# Patient Record
Sex: FEMALE | Race: WHITE | NOT HISPANIC OR LATINO | Employment: FULL TIME | URBAN - METROPOLITAN AREA
[De-identification: names, ages, dates, MRNs, and addresses within clinical notes are randomized per-mention and may not be internally consistent; named-entity substitution may affect disease eponyms.]

---

## 2018-11-20 ENCOUNTER — OFFICE VISIT (OUTPATIENT)
Dept: FAMILY MEDICINE | Facility: CLINIC | Age: 43
End: 2018-11-20
Payer: COMMERCIAL

## 2018-11-20 ENCOUNTER — RADIANT APPOINTMENT (OUTPATIENT)
Dept: MAMMOGRAPHY | Facility: CLINIC | Age: 43
End: 2018-11-20
Payer: COMMERCIAL

## 2018-11-20 VITALS
BODY MASS INDEX: 22.66 KG/M2 | DIASTOLIC BLOOD PRESSURE: 64 MMHG | HEART RATE: 70 BPM | WEIGHT: 136 LBS | OXYGEN SATURATION: 99 % | TEMPERATURE: 98.1 F | SYSTOLIC BLOOD PRESSURE: 104 MMHG | HEIGHT: 65 IN

## 2018-11-20 DIAGNOSIS — N76.0 BV (BACTERIAL VAGINOSIS): ICD-10-CM

## 2018-11-20 DIAGNOSIS — Z30.011 INITIATION OF OCP (BCP): ICD-10-CM

## 2018-11-20 DIAGNOSIS — Z00.00 ANNUAL PHYSICAL EXAM: Primary | ICD-10-CM

## 2018-11-20 DIAGNOSIS — R53.82 CHRONIC FATIGUE: ICD-10-CM

## 2018-11-20 DIAGNOSIS — B96.89 BV (BACTERIAL VAGINOSIS): ICD-10-CM

## 2018-11-20 DIAGNOSIS — Z12.31 ENCOUNTER FOR SCREENING MAMMOGRAM FOR BREAST CANCER: ICD-10-CM

## 2018-11-20 DIAGNOSIS — N89.8 VAGINAL DISCHARGE: ICD-10-CM

## 2018-11-20 LAB
SPECIMEN SOURCE: ABNORMAL
WET PREP SPEC: ABNORMAL

## 2018-11-20 PROCEDURE — 87210 SMEAR WET MOUNT SALINE/INK: CPT | Performed by: FAMILY MEDICINE

## 2018-11-20 PROCEDURE — G0145 SCR C/V CYTO,THINLAYER,RESCR: HCPCS | Performed by: FAMILY MEDICINE

## 2018-11-20 PROCEDURE — 99213 OFFICE O/P EST LOW 20 MIN: CPT | Mod: 25 | Performed by: FAMILY MEDICINE

## 2018-11-20 PROCEDURE — 77067 SCR MAMMO BI INCL CAD: CPT | Mod: TC

## 2018-11-20 PROCEDURE — 99386 PREV VISIT NEW AGE 40-64: CPT | Performed by: FAMILY MEDICINE

## 2018-11-20 PROCEDURE — 87624 HPV HI-RISK TYP POOLED RSLT: CPT | Performed by: FAMILY MEDICINE

## 2018-11-20 RX ORDER — METRONIDAZOLE 500 MG/1
500 TABLET ORAL 2 TIMES DAILY
Qty: 14 TABLET | Refills: 0 | Status: SHIPPED | OUTPATIENT
Start: 2018-11-20 | End: 2018-11-27

## 2018-11-20 RX ORDER — DROSPIRENONE AND ETHINYL ESTRADIOL 0.03MG-3MG
1 KIT ORAL DAILY
Qty: 90 TABLET | Refills: 3 | Status: SHIPPED | OUTPATIENT
Start: 2018-11-20 | End: 2019-10-15

## 2018-11-20 NOTE — PROGRESS NOTES
SUBJECTIVE:   CC: Melissa Samuel is an 43 year old woman who presents for preventive health visit.     Healthy Habits:    Do you get at least three servings of calcium containing foods daily (dairy, green leafy vegetables, etc.)? yes    Amount of exercise or daily activities, outside of work: non    Problems taking medications regularly No    Medication side effects: Not now but was taking BC and was having extreme fatigue from it    Have you had an eye exam in the past two years? lasik    Do you see a dentist twice per year? yes    Do you have sleep apnea, excessive snoring or daytime drowsiness?no      Patient has been on Erlinda OCP in the past.  She stopped the medication as she thought it was causing fatigue.  Fatigue did improve after stopping the medication.  She was also using it not only for hirsutism and acne control but also for contraception.  She tells for the last 3 months since she stopped OCP she has not been sexually active. Periods are regular but it is causing more crampiness and more abdominal bloating and constipation.  She would like to be back on the medication.    Patient complains of a white discharge which is foul in order.    Today's PHQ-2 Score:   PHQ-2 (  [employer]) 2018   Q1: Little interest or pleasure in doing things 0   Q2: Feeling down, depressed or hopeless 0   PHQ-2 Score 0       Abuse: Current or Past(Physical, Sexual or Emotional)- No  Do you feel safe in your environment - Yes    Social History   Substance Use Topics     Smoking status: Never Smoker     Smokeless tobacco: Never Used     Alcohol use No     If you drink alcohol do you typically have >3 drinks per day or >7 drinks per week? No                     Reviewed orders with patient.  Reviewed health maintenance and updated orders accordingly - Yes  There is no problem list on file for this patient.    Past Surgical History:   Procedure Laterality Date      SECTION      2       Social History    Substance Use Topics     Smoking status: Never Smoker     Smokeless tobacco: Never Used     Alcohol use No     Family History   Problem Relation Age of Onset     Hypertension Mother      Hyperlipidemia Mother      Hypertension Father      Hyperlipidemia Father      Prostate Cancer Father          Current Outpatient Prescriptions   Medication Sig Dispense Refill     drospirenone-ethinyl estradiol (OCELLA) 3-0.03 MG per tablet Take 1 tablet by mouth daily 90 tablet 3     Fexofenadine HCl (ALLEGRA PO) Take 180 mg by mouth daily       metroNIDAZOLE (FLAGYL) 500 MG tablet Take 1 tablet (500 mg) by mouth 2 times daily for 7 days 14 tablet 0     Allergies   Allergen Reactions     Ciprofloxacin Hives       Patient under age 50, mutual decision reflected in health maintenance.      Pertinent mammograms are reviewed under the imaging tab.  History of abnormal Pap smear: NO - age 30- 65 PAP every 5 years recommended     Reviewed and updated as needed this visit by clinical staff  Tobacco  Allergies  Meds  Med Hx  Surg Hx  Fam Hx  Soc Hx        Reviewed and updated as needed this visit by Provider  Allergies  Meds  Med Hx  Fam Hx            ROS:  CONSTITUTIONAL: NEGATIVE for fever, chills, change in weight  INTEGUMENTARU/SKIN: NEGATIVE for worrisome rashes, moles or lesions  EYES: NEGATIVE for vision changes or irritation  ENT: NEGATIVE for ear, mouth and throat problems  RESP: NEGATIVE for significant cough or SOB  BREAST: NEGATIVE for masses, tenderness or discharge  CV: NEGATIVE for chest pain, palpitations or peripheral edema  GI: NEGATIVE for nausea, abdominal pain, heartburn, or change in bowel habits  : NEGATIVE for unusual urinary or vaginal symptoms. Periods are regular.  MUSCULOSKELETAL: NEGATIVE for significant arthralgias or myalgia  NEURO: NEGATIVE for weakness, dizziness or paresthesias  PSYCHIATRIC: NEGATIVE for changes in mood or affect    OBJECTIVE:   /64  Pulse 70  Temp 98.1  F (36.7  C)  "(Tympanic)  Ht 5' 5.5\" (1.664 m)  Wt 136 lb (61.7 kg)  LMP 11/10/2018  SpO2 99%  BMI 22.29 kg/m2  EXAM:  GENERAL: healthy, alert and no distress  EYES: Eyes grossly normal to inspection, PERRL and conjunctivae and sclerae normal  HENT: ear canals and TM's normal, nose and mouth without ulcers or lesions  NECK: no adenopathy, no asymmetry, masses, or scars and thyroid normal to palpation  RESP: lungs clear to auscultation - no rales, rhonchi or wheezes  BREAST: normal without masses, tenderness or nipple discharge and no palpable axillary masses or adenopathy  CV: regular rate and rhythm, normal S1 S2, no S3 or S4, no murmur, click or rub, no peripheral edema and peripheral pulses strong  ABDOMEN: soft, nontender, no hepatosplenomegaly, no masses and bowel sounds normal   (female): normal female external genitalia, normal urethral meatus, vaginal mucosa pink, moist, well rugated, and normal cervix/adnexa/uterus without masses.  White discharge noted  MS: no gross musculoskeletal defects noted, no edema  SKIN: no suspicious lesions or rashes  NEURO: Normal strength and tone, mentation intact and speech normal  PSYCH: mentation appears normal, affect normal/bright        ASSESSMENT/PLAN:   1. Annual physical exam  Screening fasting labs and Pap smear ordered.  - Lipid panel reflex to direct LDL Fasting; Future  - Comprehensive metabolic panel; Future  - HIV Antigen Antibody Combo; Future  - Pap imaged thin layer screen with HPV - recommended age 30 - 65 years (select HPV order below)  - HPV High Risk Types DNA Cervical    2. Initiation of OCP (BCP)  Starting the patient back on birth control pill.  - drospirenone-ethinyl estradiol (OCELLA) 3-0.03 MG per tablet; Take 1 tablet by mouth daily  Dispense: 90 tablet; Refill: 3    3. Chronic fatigue  Chronic fatigue could be due to other factors.  If after starting birth control pill, she notices fatigue starting up again we may need to consider other birth control " "options like Mirena.  This was discussed with the patient.  - CBC with platelets; Future  - TSH with free T4 reflex; Future  - Vitamin D Deficiency; Future  - Ferritin; Future  - Iron and iron binding capacity; Future    4. Encounter for screening mammogram for breast cancer    - *MA Screening Digital Bilateral; Future    5. Vaginal discharge    - Wet prep- BV noted    6. BV (bacterial vaginosis)  Started patient on metronidazole.  - metroNIDAZOLE (FLAGYL) 500 MG tablet; Take 1 tablet (500 mg) by mouth 2 times daily for 7 days  Dispense: 14 tablet; Refill: 0    COUNSELING:   Reviewed preventive health counseling, as reflected in patient instructions       Regular exercise       Healthy diet/nutrition    BP Readings from Last 1 Encounters:   11/20/18 104/64     Estimated body mass index is 22.29 kg/(m^2) as calculated from the following:    Height as of this encounter: 5' 5.5\" (1.664 m).    Weight as of this encounter: 136 lb (61.7 kg).           reports that she has never smoked. She has never used smokeless tobacco.      Counseling Resources:  ATP IV Guidelines  Pooled Cohorts Equation Calculator  Breast Cancer Risk Calculator  FRAX Risk Assessment  ICSI Preventive Guidelines  Dietary Guidelines for Americans, 2010  [employer]'s MyPlate  ASA Prophylaxis  Lung CA Screening    Martínez Mitchell MD  [facility] [place]  "

## 2018-11-20 NOTE — MR AVS SNAPSHOT
After Visit Summary   11/20/2018    Melissa Samuel    MRN: [id number]           Patient Information     Date Of Birth          1975        Visit Information        Provider Department      11/20/2018 4:00 PM Martínez Mitchell MD [facility] [place]        Today's Diagnoses     Annual physical exam    -  1    Initiation of OCP (BCP)        Chronic fatigue        Encounter for screening mammogram for breast cancer        Vaginal discharge          Care Instructions      Preventive Health Recommendations  Female Ages 40 to 49    Yearly exam:     See your health care provider every year in order to  1. Review health changes.   2. Discuss preventive care.    3. Review your medicines if your doctor prescribed any.      Get a Pap test every three years (unless you have an abnormal result and your provider advises testing more often).      If you get Pap tests with HPV test, you only need to test every 5 years, unless you have an abnormal result. You do not need a Pap test if your uterus was removed (hysterectomy) and you have not had cancer.      You should be tested each year for STDs (sexually transmitted diseases), if you're at risk.     Ask your doctor if you should have a mammogram.      Have a colonoscopy (test for colon cancer) if someone in your family has had colon cancer or polyps before age 50.       Have a cholesterol test every 5 years.       Have a diabetes test (fasting glucose) after age 45. If you are at risk for diabetes, you should have this test every 3 years.    Shots: Get a flu shot each year. Get a tetanus shot every 10 years.     Nutrition:     Eat at least 5 servings of fruits and vegetables each day.    Eat whole-grain bread, whole-wheat pasta and brown rice instead of white grains and rice.    Get adequate Calcium and Vitamin D.      Lifestyle    Exercise at least 150 minutes a week (an average of 30 minutes a day, 5 days a week). This will help you control your weight  and prevent disease.    Limit alcohol to one drink per day.    No smoking.     Wear sunscreen to prevent skin cancer.    See your dentist every six months for an exam and cleaning.          Follow-ups after your visit        Follow-up notes from your care team     Return in about 1 day (around 11/21/2018) for Fasting labs only visit.      Your next 10 appointments already scheduled     Nov 26, 2018 11:30 AM CST   LAB with [facility]   [facility]    [address] [place] [address] [id number] [phone]           OUTSIDE LABS: Please include name of facility and Physician that is requesting outside labs be drawn.  Please indicate if labs are fasting or non-fasting on appt notes.  Be as specific as you can on which labs are being drawn.              Future tests that were ordered for you today     Open Future Orders        Priority Expected Expires Ordered    *MA Screening Digital Bilateral Routine  11/20/2019 11/20/2018    Lipid panel reflex to direct LDL Fasting Routine  3/20/2019 11/20/2018    Comprehensive metabolic panel Routine  3/20/2019 11/20/2018    CBC with platelets Routine  3/20/2019 11/20/2018    TSH with free T4 reflex Routine  3/20/2019 11/20/2018    Vitamin D Deficiency Routine  3/20/2019 11/20/2018    Ferritin Routine  3/20/2019 11/20/2018    Iron and iron binding capacity Routine  3/20/2019 11/20/2018    HIV Antigen Antibody Combo Routine  11/20/2019 11/20/2018            Who to contact     If you have questions or need follow up information about today's clinic visit or your schedule please contact [facility] directly at [phone].  Normal or non-critical lab and imaging results will be communicated to you by MyChart, letter or phone within 4 business days after the clinic has received the results. If you do not hear from us within 7 days, please contact the clinic through MyChart or phone. If you have a critical or  "abnormal lab result, we will notify you by phone as soon as possible.  Submit refill requests through My Team Zone or call your pharmacy and they will forward the refill request to us. Please allow 3 business days for your refill to be completed.          Additional Information About Your Visit        [employer] Information     My Team Zone lets you send messages to your doctor, view your test results, renew your prescriptions, schedule appointments and more. To sign up, go to [email or web address] . Click on \"Log in\" on the left side of the screen, which will take you to the Welcome page. Then click on \"Sign up Now\" on the right side of the page.     You will be asked to enter the access code listed below, as well as some personal information. Please follow the directions to create your username and password.     Your access code is: [email or web address]  Expires: 2019  4:44 PM     Your access code will  in 90 days. If you need help or a new code, please call your [place] clinic or [phone].        Care EveryWhere ID     This is your Care EveryWhere ID. This could be used by other organizations to access your [place] medical records  [id number]        Your Vitals Were     Pulse Temperature Height Last Period Pulse Oximetry BMI (Body Mass Index)    70 98.1  F (36.7  C) (Tympanic) 5' 5.5\" (1.664 m) 11/10/2018 99% 22.29 kg/m2       Blood Pressure from Last 3 Encounters:   18 104/64    Weight from Last 3 Encounters:   18 136 lb (61.7 kg)              We Performed the Following     Wet prep          Today's Medication Changes          These changes are accurate as of 18  4:44 PM.  If you have any questions, ask your nurse or doctor.               Start taking these medicines.        Dose/Directions    drospirenone-ethinyl estradiol 3-0.03 MG per tablet   Commonly known as:  OCELLA   Used for:  Initiation of OCP (BCP)   Started by:  Martínez Mitchell MD        Dose:  1 tablet   Take 1 tablet by mouth daily "   Quantity:  90 tablet   Refills:  3            Where to get your medicines      These medications were sent to [id number] [address] [place] [address] AT [facility] [address]  [address]  [address] [address], [facility] [address]     Phone:  [phone]     drospirenone-ethinyl estradiol 3-0.03 MG per tablet                Primary Care Provider Office Phone # Fax #    Martínez Mitchell [phone]       [address]  [place] [address]        Equal Access to Services     [facility] : Hadii aad ku hadasho Soomaali, waaxda luqadaha, qaybta kaalmada adeegyada, javan brown . So [facility] [phone].    ATENCIÓN: Si habla español, tiene a harrell disposición servicios gratuitos de asistencia lingüística. [facility] [phone].    We comply with applicable federal civil rights laws and [place] laws. We do not discriminate on the basis of race, color, national origin, age, disability, sex, sexual orientation, or gender identity.            Thank you!     Thank you for choosing [facility] [place]  for your care. Our goal is always to provide you with excellent care. Hearing back from our patients is one way we can continue to improve our services. Please take a few minutes to complete the written survey that you may receive in the mail after your visit with us. Thank you!             Your Updated Medication List - Protect others around you: Learn how to safely use, store and throw away your medicines at www.disposemymeds.org.          This list is accurate as of 11/20/18  4:44 PM.  Always use your most recent med list.                   Brand Name Dispense Instructions for use Diagnosis    ALLEGRA PO      Take 180 mg by mouth daily        drospirenone-ethinyl estradiol 3-0.03 MG per tablet    OCELLA    90 tablet    Take 1 tablet by mouth daily    Initiation of OCP (BCP)

## 2018-11-20 NOTE — PROGRESS NOTES
Please call the patient to notify patient that the wet prep shows bacterial vaginosis.  Metronidazole ordered for 7 days    Martínez Mitchell MD

## 2018-11-26 DIAGNOSIS — Z00.00 ANNUAL PHYSICAL EXAM: ICD-10-CM

## 2018-11-26 DIAGNOSIS — R79.89 ABNORMAL LFTS: Primary | ICD-10-CM

## 2018-11-26 DIAGNOSIS — R53.82 CHRONIC FATIGUE: ICD-10-CM

## 2018-11-26 LAB
COPATH REPORT: NORMAL
ERYTHROCYTE [DISTWIDTH] IN BLOOD BY AUTOMATED COUNT: 12.9 % (ref 10–15)
HCT VFR BLD AUTO: 39.9 % (ref 35–47)
HGB BLD-MCNC: 12.9 G/DL (ref 11.7–15.7)
MCH RBC QN AUTO: 29.7 PG (ref 26.5–33)
MCHC RBC AUTO-ENTMCNC: 32.3 G/DL (ref 31.5–36.5)
MCV RBC AUTO: 92 FL (ref 78–100)
PAP: NORMAL
PLATELET # BLD AUTO: 184 10E9/L (ref 150–450)
RBC # BLD AUTO: 4.34 10E12/L (ref 3.8–5.2)
WBC # BLD AUTO: 6.7 10E9/L (ref 4–11)

## 2018-11-26 PROCEDURE — 82306 VITAMIN D 25 HYDROXY: CPT | Performed by: FAMILY MEDICINE

## 2018-11-26 PROCEDURE — 83540 ASSAY OF IRON: CPT | Performed by: FAMILY MEDICINE

## 2018-11-26 PROCEDURE — 80061 LIPID PANEL: CPT | Performed by: FAMILY MEDICINE

## 2018-11-26 PROCEDURE — 36415 COLL VENOUS BLD VENIPUNCTURE: CPT | Performed by: FAMILY MEDICINE

## 2018-11-26 PROCEDURE — 83550 IRON BINDING TEST: CPT | Performed by: FAMILY MEDICINE

## 2018-11-26 PROCEDURE — 85027 COMPLETE CBC AUTOMATED: CPT | Performed by: FAMILY MEDICINE

## 2018-11-26 PROCEDURE — 84443 ASSAY THYROID STIM HORMONE: CPT | Performed by: FAMILY MEDICINE

## 2018-11-26 PROCEDURE — 82728 ASSAY OF FERRITIN: CPT | Performed by: FAMILY MEDICINE

## 2018-11-26 PROCEDURE — 87389 HIV-1 AG W/HIV-1&-2 AB AG IA: CPT | Performed by: FAMILY MEDICINE

## 2018-11-26 PROCEDURE — 80053 COMPREHEN METABOLIC PANEL: CPT | Performed by: FAMILY MEDICINE

## 2018-11-27 LAB
ALBUMIN SERPL-MCNC: 4.4 G/DL (ref 3.4–5)
ALP SERPL-CCNC: 41 U/L (ref 40–150)
ALT SERPL W P-5'-P-CCNC: 63 U/L (ref 0–50)
ANION GAP SERPL CALCULATED.3IONS-SCNC: 9 MMOL/L (ref 3–14)
AST SERPL W P-5'-P-CCNC: 55 U/L (ref 0–45)
BILIRUB SERPL-MCNC: 0.7 MG/DL (ref 0.2–1.3)
BUN SERPL-MCNC: 13 MG/DL (ref 7–30)
CALCIUM SERPL-MCNC: 9.6 MG/DL (ref 8.5–10.1)
CHLORIDE SERPL-SCNC: 104 MMOL/L (ref 94–109)
CHOLEST SERPL-MCNC: 152 MG/DL
CO2 SERPL-SCNC: 24 MMOL/L (ref 20–32)
CREAT SERPL-MCNC: 0.59 MG/DL (ref 0.52–1.04)
DEPRECATED CALCIDIOL+CALCIFEROL SERPL-MC: 30 UG/L (ref 20–75)
FERRITIN SERPL-MCNC: 60 NG/ML (ref 12–150)
FINAL DIAGNOSIS: NORMAL
GFR SERPL CREATININE-BSD FRML MDRD: >90 ML/MIN/1.7M2
GLUCOSE SERPL-MCNC: 86 MG/DL (ref 70–99)
HDLC SERPL-MCNC: 66 MG/DL
HIV 1+2 AB+HIV1 P24 AG SERPL QL IA: NONREACTIVE
HPV HR 12 DNA CVX QL NAA+PROBE: NEGATIVE
HPV16 DNA SPEC QL NAA+PROBE: NEGATIVE
HPV18 DNA SPEC QL NAA+PROBE: NEGATIVE
IRON SATN MFR SERPL: 34 % (ref 15–46)
IRON SERPL-MCNC: 121 UG/DL (ref 35–180)
LDLC SERPL CALC-MCNC: 77 MG/DL
NONHDLC SERPL-MCNC: 86 MG/DL
POTASSIUM SERPL-SCNC: 3.8 MMOL/L (ref 3.4–5.3)
PROT SERPL-MCNC: 7.9 G/DL (ref 6.8–8.8)
SODIUM SERPL-SCNC: 137 MMOL/L (ref 133–144)
SPECIMEN DESCRIPTION: NORMAL
SPECIMEN SOURCE CVX/VAG CYTO: NORMAL
TIBC SERPL-MCNC: 361 UG/DL (ref 240–430)
TRIGL SERPL-MCNC: 46 MG/DL
TSH SERPL DL<=0.005 MIU/L-ACNC: 1.9 MU/L (ref 0.4–4)

## 2019-10-11 ENCOUNTER — NURSE TRIAGE (OUTPATIENT)
Dept: FAMILY MEDICINE | Facility: CLINIC | Age: 44
End: 2019-10-11

## 2019-10-11 NOTE — TELEPHONE ENCOUNTER
Reason for call:  Patient reporting a symptom    Symptom or request: vaginal odor and wetness - has had bacterial vaginosis in the past and states it is the same symptoms as before. Patient would like to know if she should come in or if she can get a prescription sent to [facility] [address] in [place] instead.     Duration (how long have symptoms been present): 7 weeks    Have you been treated for this before? Yes    Additional comments: none    Phone Number patient can be reached at:  Cell number on file:    Telephone Information:   Mobile [phone]       Best Time:  anytime    Can we leave a detailed message on this number:  YES    Call taken on 10/11/2019 at 2:47 PM by Rajiv MUNOZ

## 2019-10-11 NOTE — TELEPHONE ENCOUNTER
"Pt thinks she has bacterial vaginosis again and wondering if needs to be seen or if can be treated with medication again?    Advised needs to be seen and is scheduled with Dr. Mitchell on Tuesday at 4:00 pm.  Pt agrees with amirahtNing MOORE RN  EP Triage    Reason for Disposition    All other vaginal symptoms  (Exception: feels like prior yeast infection, minor abrasion, mild rash < 24 hour duration, mild itching)    Additional Information    Negative: Followed a genital area injury    Negative: Foreign body in vagina (e.g., tampon)    Negative: Vaginal bleeding is main symptom    Negative: Vaginal discharge is main symptom    Negative: Pain or burning with urination is main symptom    Negative: Menstrual cramps is main symptom    Negative: Abdomen pain is main symptom    Negative: Pubic lice suspected    Negative: Itching or rash of external female genital area (vulva)    Negative: Patient sounds very sick or weak to the triager    Negative: [1] SEVERE pain AND [2] not improved 2 hours after pain medicine    Negative: [1] Genital area looks infected (e.g., draining sore, spreading redness) AND [2] fever    Negative: [1] Something is hanging out of the vagina AND [2] can't easily be pushed back inside    Negative: MODERATE-SEVERE itching (i.e., interferes with school, work, or sleep)    Negative: Genital area looks infected (e.g., draining sore, spreading redness)    Negative: Rash with painful tiny water blisters    Negative: [1] Rash (e.g., redness, tiny bumps, sore) of genital area AND [2] present > 24 hours    Negative: Tender lump (swelling or \"ball\") at vaginal opening    Negative: [1] Symptoms of a yeast infection (i.e., itchy, white discharge, not bad smelling) AND    [2] not improved > 3 days following CARE ADVICE    Negative: [1] Vaginal itching AND [2] not improved > 3 days following CARE ADVICE    Negative: Patient is worried about sexually transmitted disease (STD)    Answer Assessment - Initial " "Assessment Questions  1. SYMPTOM: \"What's the main symptom you're concerned about?\" (e.g., pain, itching, dryness)      Odor, wetness, and discharge  2. LOCATION: \"Where is the   located?\" (e.g., inside/outside, left/right)      Outside the vagina  3. ONSET: \"When did the    start?\"      7 weeks ago  4. PAIN: \"Is there any pain?\" If so, ask: \"How bad is it?\" (Scale: 1-10; mild, moderate, severe)      no  5. ITCHING: \"Is there any itching?\" If so, ask: \"How bad is it?\" (Scale: 1-10; mild, moderate, severe)      no  6. CAUSE: \"What do you think is causing the discharge?\" \"Have you had the same problem before? What happened then?\"      Bacterial vaginosis.  Yes had the problem before in Nov 2018 and the same sxs of odor, wetness, and discharge  7. OTHER SYMPTOMS: \"Do you have any other symptoms?\" (e.g., fever, itching, vaginal bleeding, pain with urination, injury to genital area, vaginal foreign body)      No other sxs  8. PREGNANCY: \"Is there any chance you are pregnant?\" \"When was your last menstrual period?\"      no    Protocols used: VAGINAL SYMPTOMS-A-AH    "

## 2019-10-15 ENCOUNTER — OFFICE VISIT (OUTPATIENT)
Dept: FAMILY MEDICINE | Facility: CLINIC | Age: 44
End: 2019-10-15
Payer: COMMERCIAL

## 2019-10-15 VITALS
OXYGEN SATURATION: 99 % | SYSTOLIC BLOOD PRESSURE: 104 MMHG | HEIGHT: 65 IN | HEART RATE: 77 BPM | WEIGHT: 140 LBS | BODY MASS INDEX: 23.32 KG/M2 | DIASTOLIC BLOOD PRESSURE: 64 MMHG | TEMPERATURE: 97.3 F

## 2019-10-15 DIAGNOSIS — Z30.8 ENCOUNTER FOR OTHER CONTRACEPTIVE MANAGEMENT: ICD-10-CM

## 2019-10-15 DIAGNOSIS — N89.8 VAGINAL ODOR: Primary | ICD-10-CM

## 2019-10-15 LAB
SPECIMEN SOURCE: NORMAL
WET PREP SPEC: NORMAL

## 2019-10-15 PROCEDURE — 87210 SMEAR WET MOUNT SALINE/INK: CPT | Performed by: FAMILY MEDICINE

## 2019-10-15 PROCEDURE — 99213 OFFICE O/P EST LOW 20 MIN: CPT | Performed by: FAMILY MEDICINE

## 2019-10-15 RX ORDER — DROSPIRENONE AND ETHINYL ESTRADIOL 0.03MG-3MG
1 KIT ORAL DAILY
Qty: 90 TABLET | Refills: 3 | Status: SHIPPED | OUTPATIENT
Start: 2019-10-15 | End: 2019-12-12

## 2019-10-15 ASSESSMENT — MIFFLIN-ST. JEOR: SCORE: 1303.79

## 2019-10-15 NOTE — PROGRESS NOTES
Subjective     Melissa Samuel is a 44 year old female who presents to clinic today for the following health issues:    HPI   Vaginal Symptoms  Onset: x 6 weeks    Description:  Vaginal Discharge: water like    Itching (Pruritis): no   Burning sensation:  YES  Odor: YES    Accompanying Signs & Symptoms:  Pain with Urination: no   Abdominal Pain: no   Fever: no     History:   Sexually active: YES  New Partner: no   Possibility of Pregnancy:  No    Precipitating factors:   Recent Antibiotic Use: no     Alleviating factors:      Patient would also like to start OCPs back again.  She tells that she was on brand-name Mahendra for 10 years without any problems.  She was switched to generic of that and within 3 months she started noticing excessive bloating.  Would like to get brand-name Mahendra ordered again for her.  She wonders if it is due to hormone imbalance that she is getting excessive discharge.      There is no problem list on file for this patient.    Past Surgical History:   Procedure Laterality Date      SECTION      2       Social History     Tobacco Use     Smoking status: Never Smoker     Smokeless tobacco: Never Used   Substance Use Topics     Alcohol use: No     Family History   Problem Relation Age of Onset     Hypertension Mother      Hyperlipidemia Mother      Hypertension Father      Hyperlipidemia Father      Prostate Cancer Father          Current Outpatient Medications   Medication Sig Dispense Refill     Fexofenadine HCl (ALLEGRA PO) Take 180 mg by mouth daily       MAHENDRA 28 3-0.03 MG tablet Take 1 tablet by mouth daily 90 tablet 3     Allergies   Allergen Reactions     Ciprofloxacin Hives         Reviewed and updated as needed this visit by Provider         Review of Systems   ROS COMP: INTEGUMENTARY/SKIN: NEGATIVE for worrisome rashes, moles or lesions  GI: NEGATIVE for nausea, abdominal pain, heartburn, or change in bowel habits      Objective    /64   Pulse 77   Temp 97.3  F  "(36.3  C) (Tympanic)   Ht 1.664 m (5' 5.5\")   Wt 63.5 kg (140 lb)   LMP 10/01/2019   SpO2 99%   BMI 22.95 kg/m    Body mass index is 22.95 kg/m .  Physical Exam   GENERAL: healthy, alert and no distress  RESP: lungs clear to auscultation - no rales, rhonchi or wheezes  CV: regular rate and rhythm, normal S1 S2, no S3 or S4, no murmur, click or rub, no peripheral edema and peripheral pulses strong  ABDOMEN: soft, nontender, no hepatosplenomegaly, no masses and bowel sounds normal    Results for orders placed or performed in visit on 10/15/19   Wet prep   Result Value Ref Range    Specimen Description Vagina     Wet Prep No clue cells seen     Wet Prep No Trichomonas seen     Wet Prep No yeast seen              Assessment & Plan     1. Vaginal odor  No signs of vaginitis noted.  Patient reassured.  Excessive vaginal discharge is likely physiological.  - Wet prep    2. Encounter for other contraceptive management  Brand-name he has been ordered per patient's request.  - MAHENDRA 28 3-0.03 MG tablet; Take 1 tablet by mouth daily  Dispense: 90 tablet; Refill: 3         Martínez Mitchell MD  [facility] [place]      "

## 2019-11-22 ENCOUNTER — ANCILLARY PROCEDURE (OUTPATIENT)
Dept: MAMMOGRAPHY | Facility: CLINIC | Age: 44
End: 2019-11-22
Payer: COMMERCIAL

## 2019-11-22 DIAGNOSIS — Z12.31 VISIT FOR SCREENING MAMMOGRAM: ICD-10-CM

## 2019-11-22 PROCEDURE — 77067 SCR MAMMO BI INCL CAD: CPT | Mod: TC

## 2019-12-12 ENCOUNTER — OFFICE VISIT (OUTPATIENT)
Dept: OBGYN | Facility: CLINIC | Age: 44
End: 2019-12-12
Payer: COMMERCIAL

## 2019-12-12 VITALS — HEIGHT: 66 IN | WEIGHT: 139 LBS | BODY MASS INDEX: 22.34 KG/M2

## 2019-12-12 DIAGNOSIS — R79.89 ELEVATED LFTS: ICD-10-CM

## 2019-12-12 DIAGNOSIS — N94.6 DYSMENORRHEA: ICD-10-CM

## 2019-12-12 DIAGNOSIS — Z01.411 ENCOUNTER FOR GYNECOLOGICAL EXAMINATION WITH ABNORMAL FINDING: Primary | ICD-10-CM

## 2019-12-12 DIAGNOSIS — Z86.2 HISTORY OF ANEMIA: ICD-10-CM

## 2019-12-12 DIAGNOSIS — N93.9 ABNORMAL UTERINE BLEEDING (AUB): ICD-10-CM

## 2019-12-12 DIAGNOSIS — M25.559 PAIN IN JOINT INVOLVING PELVIC REGION AND THIGH, UNSPECIFIED LATERALITY: ICD-10-CM

## 2019-12-12 PROCEDURE — 80053 COMPREHEN METABOLIC PANEL: CPT | Performed by: OBSTETRICS & GYNECOLOGY

## 2019-12-12 PROCEDURE — 36415 COLL VENOUS BLD VENIPUNCTURE: CPT | Performed by: OBSTETRICS & GYNECOLOGY

## 2019-12-12 PROCEDURE — 82728 ASSAY OF FERRITIN: CPT | Performed by: OBSTETRICS & GYNECOLOGY

## 2019-12-12 PROCEDURE — 99213 OFFICE O/P EST LOW 20 MIN: CPT | Mod: 25 | Performed by: OBSTETRICS & GYNECOLOGY

## 2019-12-12 PROCEDURE — 99386 PREV VISIT NEW AGE 40-64: CPT | Performed by: OBSTETRICS & GYNECOLOGY

## 2019-12-12 RX ORDER — MULTIVIT-MIN/IRON/FOLIC ACID/K 18-600-40
1 CAPSULE ORAL DAILY
COMMUNITY
End: 2023-01-30

## 2019-12-12 RX ORDER — MULTIVIT-MIN/IRON/FOLIC ACID/K 18-600-40
2 CAPSULE ORAL DAILY
COMMUNITY
End: 2023-01-31

## 2019-12-12 ASSESSMENT — ANXIETY QUESTIONNAIRES
5. BEING SO RESTLESS THAT IT IS HARD TO SIT STILL: SEVERAL DAYS
IF YOU CHECKED OFF ANY PROBLEMS ON THIS QUESTIONNAIRE, HOW DIFFICULT HAVE THESE PROBLEMS MADE IT FOR YOU TO DO YOUR WORK, TAKE CARE OF THINGS AT HOME, OR GET ALONG WITH OTHER PEOPLE: SOMEWHAT DIFFICULT
2. NOT BEING ABLE TO STOP OR CONTROL WORRYING: SEVERAL DAYS
3. WORRYING TOO MUCH ABOUT DIFFERENT THINGS: SEVERAL DAYS
7. FEELING AFRAID AS IF SOMETHING AWFUL MIGHT HAPPEN: NOT AT ALL
GAD7 TOTAL SCORE: 6
6. BECOMING EASILY ANNOYED OR IRRITABLE: SEVERAL DAYS
1. FEELING NERVOUS, ANXIOUS, OR ON EDGE: SEVERAL DAYS

## 2019-12-12 ASSESSMENT — MIFFLIN-ST. JEOR: SCORE: 1307.25

## 2019-12-12 ASSESSMENT — PATIENT HEALTH QUESTIONNAIRE - PHQ9
5. POOR APPETITE OR OVEREATING: SEVERAL DAYS
SUM OF ALL RESPONSES TO PHQ QUESTIONS 1-9: 7

## 2019-12-12 NOTE — PATIENT INSTRUCTIONS
-Daily total calcium intake (between food/supplements) should be 1000mg which equates to 3-4 servings calcium containing food per day; VItamin D 1000IU.   Foods rich in calcium are: milk, cheese, yogurt, seafood, sardines and canned salmon, leafy green vegetables such as jonathon greens, spinach and kale, beans and lentils, almonds, seeds (poppy, sesame, celery, flavio), rhubarb, dried fruit such as figs, whey protein, tofu and edamame, amaranth, other foods with added calcium such as orange juice and some cereals.   If adequate amount not taken in diet, then a supplement may be needed.     -I also recommend increasing your dietary fiber by starting Metamucil (powder mixed in glass of water) twice daily

## 2019-12-12 NOTE — PROGRESS NOTES
Melissa is a 44 year old No obstetric history on file. female who presents for annual exam.     Besides routine health maintenance,  she would like to discuss pelvic pain all month long since June this time.    HPI:  The patient's PCP is Martínez Mitchell MD.    NP  Had been seeing FP and wanted to see GYn for some of her issues.     Mammo in Nov.  Pap 11/18 with STD testing.  Colonosc 2009.     Hx of  Heavy periods, pelvic pain, hair symptoms, acne. Thought it wasn't making her feel very well couple years ago. Stopped BC summer 2018 and had resumption of pelvic pain. Restarted in Nove 2018 and pain helped some, but felt poorly so stopped in June. Periods painful, will have pain in rectal area, midline, out to sides in ovaries. Thinks she has about 5 good days per month. Pain will feel raw, pulling. Rest of the month will be cramping. Alternates. Sitting/walking will flare it. Pushing on her abd from outside makes it worse. Takes IBU which takes edge off, magnesium oxide maybe helps some.   No pain with intercourse.   Hx c/s x 2.   Periods are getting closer together, will spt for 2-4d prior to bleeding starting, flow lasts 4-7days.   Has not had any workup for these issues.     Hx IV iron transfusions 1-2x/yr. Since daughter born no issues with this. Likes to get it checked.    No hx abnormal paps.     Hx elevated LFTs. No regular alcohol consumption.     +constipation. Uses magnesium citrate as needed.         Review of PMH, SocHx, SurHx, FHx, medications completed. Epic updated.        GYNECOLOGIC HISTORY:    Patient's last menstrual period was 11/22/2019.    Regular menses? yes  Menses every 25 days.  Length of menses: 4-7 days    Her current contraception method is: condoms.  She  reports that she has never smoked. She has never used smokeless tobacco.    Patient is sexually active.  STD testing offered?  Declined  Last PHQ-9 score on record =   PHQ-9 SCORE 12/12/2019   PHQ-9 Total Score 7     Last GAD7 score on  "record =   GABY-7 SCORE 2019   Total Score 6     Alcohol Score = 1    HEALTH MAINTENANCE:  Cholesterol:   Cholesterol   Date Value Ref Range Status   2018 152 <200 mg/dL Final      Recent Labs   Lab Test 18  1127   CHOL 152   HDL 66   LDL 77   TRIG 46     Last Mammo: 19, Result: Normal, Next Mammo: next year   Pap:   Lab Results   Component Value Date    PAP NIL 2018 WNL HPV (-)neg  Colonoscopy:  , Result: Normal, Next Colonoscopy: NA years.  Dexa:  NA    Health maintenance updated:  yes    HISTORY:  OB History    Para Term  AB Living   3 2 2 0 1 2   SAB TAB Ectopic Multiple Live Births   1 0 0 0 2      # Outcome Date GA Lbr Neo/2nd Weight Sex Delivery Anes PTL Lv   3 Term 14 39w1d  3.615 kg (7 lb 15.5 oz) F CS-Unspec Spinal N ARAM      Name: STEWART,BABY GIRL      Apgar1: 9  Apgar5: 9   2 Term 12 38w6d  3.065 kg (6 lb 12.1 oz) M CS-Unspec  N ARAM      Birth Comments: C/S for hx of fistulas      Name: Yves Simmons\"\"\"\"      Apgar1: 8  Apgar5: 9   1 SAB 2010               There is no problem list on file for this patient.    Past Surgical History:   Procedure Laterality Date     APPENDECTOMY        SECTION      x2     COLONOSCOPY      Hx IBS type symptoms, workup for this     FISTULECTOMY RECTUM        Social History     Tobacco Use     Smoking status: Never Smoker     Smokeless tobacco: Never Used   Substance Use Topics     Alcohol use: No      Problem (# of Occurrences) Relation (Name,Age of Onset)    Hyperlipidemia (2) Mother, Father    Hypertension (2) Mother, Father    No Known Problems (6) Sister, Brother, Maternal Grandmother, Maternal Grandfather, Paternal Grandmother, Other    Prostate Cancer (1) Father            Current Outpatient Medications   Medication Sig     Ascorbic Acid (VITAMIN C) 500 MG CAPS      Docosahexaenoic Acid (DHA) 200 MG capsule Take 200 mg by mouth daily     Fexofenadine HCl (ALLEGRA PO) Take 180 mg by " "mouth daily     Magnesium Oxide 140 MG CAPS      Vitamin D, Cholecalciferol, 25 MCG (1000 UT) TABS      No current facility-administered medications for this visit.      Allergies   Allergen Reactions     Ciprofloxacin Hives       Past medical, surgical, social and family histories were reviewed and updated in EPIC.    ROS:   12 point review of systems negative other than symptoms noted below or in the HPI.  Genitourinary: Cramps, Irregular Menses and Pelvic Pain  No urinary frequency or dysuria, bladder or kidney problems. Positive for constipation.     EXAM:  Ht 1.676 m (5' 6\")   Wt 63 kg (139 lb)   LMP 11/22/2019   Breastfeeding No   BMI 22.44 kg/m     BMI: Body mass index is 22.44 kg/m .    PHYSICAL EXAM:  Constitutional:   Appearance: Well nourished, well developed, alert, in no acute distress  Neck:  Lymph Nodes:  No lymphadenopathy present    Thyroid:  Gland size normal, nontender, no nodules or masses present  on palpation  Chest:  Respiratory Effort:  Breathing unlabored  Cardiovascular:    Heart: Auscultation:  Regular rate, normal rhythm, no murmurs present  Breasts: Inspection of Breasts:  No lymphadenopathy present., Palpation of Breasts and Axillae:  No masses present on palpation, no breast tenderness., Axillary Lymph Nodes:  No lymphadenopathy present. and No nodularity, asymmetry or nipple discharge bilaterally.  Gastrointestinal:   Abdominal Examination:  Abdomen with diffuse low quadrant tenderness to palpation, tone normal without rigidity or guarding, no masses present, umbilicus without lesions   Liver and Spleen:  No hepatomegaly present, liver nontender to palpation    Hernias:  No hernias present  Lymphatic: Lymph Nodes:  No other lymphadenopathy present  Skin:  General Inspection:  No rashes present, no lesions present, no areas of  discoloration  Neurologic:    Mental Status:  Oriented X3.  Normal strength and tone, sensory exam                grossly normal, mentation intact and " speech normal.    Psychiatric:   Mentation appears normal and affect normal/bright.         Pelvic Exam:  External Genitalia:     Normal appearance for age, no discharge present, no tenderness present, no inflammatory lesions present, color normal; NEG Q TIP TESTING  Vagina:     Normal vaginal vault without central or paravaginal defects, no discharge present, no inflammatory lesions present, no masses present: NEG LA, OI ttp  Bladder:     Nontender to palpation  Urethra:   Urethral Body:  Urethra palpation normal, urethra structural support normal   Urethral Meatus:  No erythema or lesions present  Cervix:     Appearance healthy, no lesions present, nontender to palpation, no bleeding present  Uterus:     Uterus: firm, normal sized and TTP, anteverted in position.   Adnexa:     Bilateral adnexal tenderness present, no adnexal masses present  Perineum:     Perineum within normal limits, no evidence of trauma, no rashes or skin lesions present  Anus:     Anus within normal limits, no hemorrhoids present  Inguinal Lymph Nodes:     No lymphadenopathy present  Pubic Hair:     Normal pubic hair distribution for age  Genitalia and Groin:     No rashes present, no lesions present, no areas of discoloration, no masses present      COUNSELING:   Reviewed preventive health counseling, as reflected in patient instructions       Osteoporosis Prevention/Bone Health    BMI: Body mass index is 22.44 kg/m .      ASSESSMENT:  44 year old female with satisfactory annual exam.    ICD-10-CM    1. Encounter for gynecological examination with abnormal finding Z01.411 Comprehensive metabolic panel   2. Abnormal uterine bleeding (AUB) N93.9 Ferritin     US Transvaginal Non OB   3. Pain in joint involving pelvic region and thigh, unspecified laterality M25.559 US Transvaginal Non OB   4. Elevated LFTs R94.5 Comprehensive metabolic panel   5. History of anemia Z86.2 Ferritin   6. Dysmenorrhea N94.6        PLAN:  -UTD for cervical cancer  screening. Reviewed guidelines-pap q 3yrs until age 30 when co-testing q 5 years.  -Breast self awareness discussed. UTD for mammogram.  -Discussed exercise-making plan, strength training. Nutrition encouraged.  -Osteoporosis prevention discussed.  -Desires CMP, Ferritin labs.   -Pelvic pain.  Discussed etiologies of pelvic pain and dysmenorrhea.  Patient is essentially had this issue for majority of her life.  Previously had been controlled with birth control pills, then when she came off and attempted resume has been less successful treating her pain.  On exam is diffusely tender, no overt masses noted.  Has no positive Q-tip testing, no tenderness of pelvic floor.  Will check pelvic ultrasound.  Discussed medication management versus surgical evaluation.  Will discuss next step after her ultrasound.  Questions answered.  -Return one year for next annual exam    (15 minutes was spent face to face with the patient today in discussion ADDITIONAL to discussing her history, diagnosis, and follow-up plan as would be typical of annual exam. Over 50% of the visit was spent in counseling and coordination of care.)          Delmi Calderon Masters, DO

## 2019-12-13 LAB
ALBUMIN SERPL-MCNC: 4.8 G/DL (ref 3.4–5)
ALP SERPL-CCNC: 57 U/L (ref 40–150)
ALT SERPL W P-5'-P-CCNC: 38 U/L (ref 0–50)
ANION GAP SERPL CALCULATED.3IONS-SCNC: 7 MMOL/L (ref 3–14)
AST SERPL W P-5'-P-CCNC: 24 U/L (ref 0–45)
BILIRUB SERPL-MCNC: 0.5 MG/DL (ref 0.2–1.3)
BUN SERPL-MCNC: 13 MG/DL (ref 7–30)
CALCIUM SERPL-MCNC: 9.4 MG/DL (ref 8.5–10.1)
CHLORIDE SERPL-SCNC: 102 MMOL/L (ref 94–109)
CO2 SERPL-SCNC: 27 MMOL/L (ref 20–32)
CREAT SERPL-MCNC: 0.65 MG/DL (ref 0.52–1.04)
FERRITIN SERPL-MCNC: 51 NG/ML (ref 12–150)
GFR SERPL CREATININE-BSD FRML MDRD: >90 ML/MIN/{1.73_M2}
GLUCOSE SERPL-MCNC: 75 MG/DL (ref 70–99)
POTASSIUM SERPL-SCNC: 3.7 MMOL/L (ref 3.4–5.3)
PROT SERPL-MCNC: 9 G/DL (ref 6.8–8.8)
SODIUM SERPL-SCNC: 136 MMOL/L (ref 133–144)

## 2019-12-13 ASSESSMENT — ANXIETY QUESTIONNAIRES: GAD7 TOTAL SCORE: 6

## 2019-12-31 NOTE — PROGRESS NOTES
SUBJECTIVE:                                                   Melissa Samuel is a 44 year old female who presents to clinic today for the following health issue(s):  Patient presents with:  Ultrasound: follow-up      HPI:  See visit from 19 regarding hx pelvic pain and dysmenorrhea.   Always has some tenderness in low pelvis if pushed on.  Period came early this last mo.  Left side hurt this mo, only 2 d instead of 4d. Has subsided, but expects will increase as gets closer to period. Overall no big changes.   Is somewhat relieved to hear there were findings on the US as she has confirmation of reasons for the pain.     Hx of using kika, ocella, maybe another generic but didn't work and felt a lot of fatigue. Couldn't get kika approved by insurance. Has tried ortho tri-cyclen and made her retain water.     No issues with anesthesia personally or family.   Hx open appy and 2 c/s.   Reviewed op reports from c/s's, no abnormalities observed/documented.     No plans for more children.     Starting new job on Monday at [employer].     Review of PMH, SocHx, SurHx, FHx, medications completed. Epic updated.      Patient's last menstrual period was 2019..     Patient is sexually active, .  Using nothing for contraception.    reports that she has never smoked. She has never used smokeless tobacco.  STD testing offered?  Declined  Health maintenance updated:  yes    Today's PHQ-2 Score:   PHQ-2 (  [employer]) 12/10/2019   Q1: Little interest or pleasure in doing things 1   Q2: Feeling down, depressed or hopeless 1   PHQ-2 Score 2   Q1: Little interest or pleasure in doing things Several days   Q2: Feeling down, depressed or hopeless Several days   PHQ-2 Score 2     Today's PHQ-9 Score:   PHQ-9 SCORE 2019   PHQ-9 Total Score 7     Today's GABY-7 Score:   GABY-7 SCORE 2019   Total Score 6       Problem list and histories reviewed & adjusted, as indicated.  Additional history: as  "documented.    There is no problem list on file for this patient.    Past Surgical History:   Procedure Laterality Date     APPENDECTOMY        SECTION      x2     COLONOSCOPY  2009    Hx IBS type symptoms, workup for this     FISTULECTOMY RECTUM        Social History     Tobacco Use     Smoking status: Never Smoker     Smokeless tobacco: Never Used   Substance Use Topics     Alcohol use: No      Problem (# of Occurrences) Relation (Name,Age of Onset)    Hyperlipidemia (2) Mother, Father    Hypertension (2) Mother, Father    No Known Problems (6) Sister, Brother, Maternal Grandmother, Maternal Grandfather, Paternal Grandmother, Other    Prostate Cancer (1) Father            Current Outpatient Medications   Medication Sig     Ascorbic Acid (VITAMIN C) 500 MG CAPS      Docosahexaenoic Acid (DHA) 200 MG capsule Take 200 mg by mouth daily     Fexofenadine HCl (ALLEGRA PO) Take 180 mg by mouth daily     Magnesium Oxide 140 MG CAPS      tranexamic acid (LYSTEDA) 650 MG tablet Take 2 tablets (1,300 mg) by mouth 3 times daily for 5 days During menses     Vitamin D, Cholecalciferol, 25 MCG (1000 UT) TABS      No current facility-administered medications for this visit.      Allergies   Allergen Reactions     Ciprofloxacin Hives       ROS:  12 point review of systems negative other than symptoms noted below or in the HPI.  Genitourinary: Pelvic Pain        OBJECTIVE:     /60   Ht 1.676 m (5' 6\")   Wt 63 kg (139 lb)   LMP 2019   BMI 22.44 kg/m    Body mass index is 22.44 kg/m .    Exam:  Constitutional:  Appearance: Well nourished, well developed alert, in no acute distress  Chest:  Respiratory Effort:  Breathing unlabored.   Neurologic:  Mental Status:  Oriented X3.  Normal strength and tone, sensory exam grossly normal, mentation intact and speech normal.    Psychiatric:  Mentation appears normal and affect normal/bright.     In-Clinic Test Results:  Results for orders placed or performed in visit " on 01/02/20 (from the past 24 hour(s))   US Transvaginal Non OB    Narrative    Gynecological Ultrasound Report  Pelvic U/S - Transvaginal  [facility] [place]  Referring Provider: Dr. Delmi West  Sonographer:  Rajwinder Richmond RDMS  Indication: pelvic pain  LMP: 12/14/19    Gynecological Ultrasonography:   Uterus: anteverted  Size: 8.6 x 5.5 x 4.2cm   Endometrium: Thickness total 8.2mm  Right Ovary: 3.6 x 2.7 x 2.2cm, complex cyst = 2.1x 2.1x 1.5cm, free fluid   vs. hydrosalpinx  Left Ovary: 3.9 x 3.6 x 3.0cm, complex cyst = 3.1x 3.3x 2.3cm  Cul de Sac/Pouch of Jere: ff vs hydrosalpinx      Impression:   Complex 2.1cm right ovarian cyst with possible hydrosalpinx. Complex left   ovarian cyst measuring 3.3cm.  Normal uterus.     Delmi West, DO                       ASSESSMENT/PLAN:                                                        ICD-10-CM    1. Pain in joint involving pelvic region and thigh, unspecified laterality M25.559    2. Cysts of both ovaries N83.201 tranexamic acid (LYSTEDA) 650 MG tablet    N83.202    3. Dysmenorrhea N94.6        -Reviewed US finding with Melissa. B/L complex ovarian cysts, FF in adnexa vs hydrosalpinx. With long hx dysmenrrhea and pelvic pain, worse when coming off OCP question possible endometriosis +/- adhesive disease from this and prior pelvic surgeries. Would recommend diagnostic laparoscopy with cystectomy. Will fulgarate endometriosis if found, treat any adhesive disease, and if hydrosalpinx confirmed, will perform salpingectomy. Discussed procedures, risks and benefits. Discussed anesthesia and recovery.   Discussed b/l salpingectomy for contraception, pt declines this at this time though she is done with child bearing.   Recommend returning to hormonal med for symptom and period suppression. Discussed mirena IUD and what this offers, risks/benefits, could place intraoperatively or postop. Brochure given. Pt will consider options and decide.  rec this starting right after procedure done.  Offered contraceptive pills in mean time, pt declines this.   She is starting new job next week, likely will want to wait for surgery for month or two. Will have surgery scheduling call pt to discuss dates.   Questions answered    (25 minutes was spent face to face with the patient today discussing her history, diagnosis, and follow-up plan as noted above. Over 50% of the visit was spent in counseling and coordination of care.)      Delmi Calderon Masters, [facility] [place]

## 2020-01-02 ENCOUNTER — PREP FOR PROCEDURE (OUTPATIENT)
Dept: OBGYN | Facility: CLINIC | Age: 45
End: 2020-01-02

## 2020-01-02 ENCOUNTER — TELEPHONE (OUTPATIENT)
Dept: OBGYN | Facility: CLINIC | Age: 45
End: 2020-01-02

## 2020-01-02 ENCOUNTER — ANCILLARY PROCEDURE (OUTPATIENT)
Dept: ULTRASOUND IMAGING | Facility: CLINIC | Age: 45
End: 2020-01-02
Attending: OBSTETRICS & GYNECOLOGY
Payer: COMMERCIAL

## 2020-01-02 ENCOUNTER — OFFICE VISIT (OUTPATIENT)
Dept: OBGYN | Facility: CLINIC | Age: 45
End: 2020-01-02
Attending: OBSTETRICS & GYNECOLOGY
Payer: COMMERCIAL

## 2020-01-02 VITALS
BODY MASS INDEX: 22.34 KG/M2 | DIASTOLIC BLOOD PRESSURE: 60 MMHG | HEIGHT: 66 IN | SYSTOLIC BLOOD PRESSURE: 112 MMHG | WEIGHT: 139 LBS

## 2020-01-02 DIAGNOSIS — M25.559 PAIN IN JOINT INVOLVING PELVIC REGION AND THIGH, UNSPECIFIED LATERALITY: ICD-10-CM

## 2020-01-02 DIAGNOSIS — M25.559 PAIN IN JOINT INVOLVING PELVIC REGION AND THIGH, UNSPECIFIED LATERALITY: Primary | ICD-10-CM

## 2020-01-02 DIAGNOSIS — N94.6 DYSMENORRHEA: ICD-10-CM

## 2020-01-02 DIAGNOSIS — N83.201 CYSTS OF BOTH OVARIES: Primary | ICD-10-CM

## 2020-01-02 DIAGNOSIS — N83.201 CYSTS OF BOTH OVARIES: ICD-10-CM

## 2020-01-02 DIAGNOSIS — N83.202 CYSTS OF BOTH OVARIES: ICD-10-CM

## 2020-01-02 DIAGNOSIS — N83.202 CYSTS OF BOTH OVARIES: Primary | ICD-10-CM

## 2020-01-02 DIAGNOSIS — N93.9 ABNORMAL UTERINE BLEEDING (AUB): ICD-10-CM

## 2020-01-02 PROCEDURE — 76830 TRANSVAGINAL US NON-OB: CPT | Performed by: OBSTETRICS & GYNECOLOGY

## 2020-01-02 PROCEDURE — 99214 OFFICE O/P EST MOD 30 MIN: CPT | Performed by: OBSTETRICS & GYNECOLOGY

## 2020-01-02 RX ORDER — TRANEXAMIC ACID 650 MG/1
1300 TABLET ORAL 3 TIMES DAILY
Qty: 30 TABLET | Refills: 4 | Status: SHIPPED | OUTPATIENT
Start: 2020-01-02 | End: 2020-01-30

## 2020-01-02 ASSESSMENT — MIFFLIN-ST. JEOR: SCORE: 1307.25

## 2020-01-07 NOTE — TELEPHONE ENCOUNTER
Pt LVM stating she just started a new jov and will call in a few weeks to get this on the schedule for late February.    Waiting patient call    Carolina Ferrara  Surgery Scheduler

## 2020-01-13 PROBLEM — N83.201 CYSTS OF BOTH OVARIES: Status: ACTIVE | Noted: 2020-01-13

## 2020-01-13 PROBLEM — M25.559 PAIN IN JOINT INVOLVING PELVIC REGION AND THIGH, UNSPECIFIED LATERALITY: Status: ACTIVE | Noted: 2020-01-13

## 2020-01-13 PROBLEM — N94.6 DYSMENORRHEA: Status: ACTIVE | Noted: 2020-01-13

## 2020-01-13 PROBLEM — N83.202 CYSTS OF BOTH OVARIES: Status: ACTIVE | Noted: 2020-01-13

## 2020-01-13 NOTE — TELEPHONE ENCOUNTER
Type of surgery: LSC BL CYSTECTOMY POSS RS ABLATION  Location of surgery: [facility]  Date and time of surgery: 1/21/2020 10:35a  Surgeon: Brett hess/Meryl  Pre-Op Appt Date: HOSPITAL UPDATE  Post-Op Appt Date: TBD   Packet sent out: MAILED 1/13/2020  Pre-cert/Authorization completed:  TBD  Date: 1/13/2020 Khanh hess/Funmilayo Ferrara  Surgery Scheduler

## 2020-01-15 ENCOUNTER — TELEPHONE (OUTPATIENT)
Dept: OBGYN | Facility: CLINIC | Age: 45
End: 2020-01-15

## 2020-01-15 RX ORDER — CEFAZOLIN SODIUM 2 G/100ML
2 INJECTION, SOLUTION INTRAVENOUS
Status: CANCELLED | OUTPATIENT
Start: 2020-01-15

## 2020-01-15 RX ORDER — CEFAZOLIN SODIUM 1 G/3ML
1 INJECTION, POWDER, FOR SOLUTION INTRAMUSCULAR; INTRAVENOUS SEE ADMIN INSTRUCTIONS
Status: CANCELLED | OUTPATIENT
Start: 2020-01-15

## 2020-01-15 NOTE — TELEPHONE ENCOUNTER
Having surgery 1/21/20 needs a note to be off from work from 1/21-24th. Composed letter and sent to pt's email at [email or web address]  Rachel Jacobsen RN on 1/15/2020 at 12:58 PM

## 2020-01-15 NOTE — LETTER
[facility]  [address] [address]  Phone: [phone]  Fax: [phone]    January 15, 2020        Melissa Samuel  [address] [facility] [address]          To whom it may concern:    RE: Melissa Torres will be out of work for a surgical procedure and recovery from 1/21/20 thru 12/24/20. She may return to work without restrictions 1/27/20.    Please contact me for questions or concerns.      Sincerely,        Delmi Calderon Masters, DO

## 2020-01-21 ENCOUNTER — HOSPITAL ENCOUNTER (OUTPATIENT)
Facility: CLINIC | Age: 45
Discharge: HOME | End: 2020-01-21
Attending: OBSTETRICS & GYNECOLOGY | Admitting: OBSTETRICS & GYNECOLOGY
Payer: COMMERCIAL

## 2020-01-21 ENCOUNTER — ANESTHESIA EVENT (OUTPATIENT)
Dept: SURGERY | Facility: CLINIC | Age: 45
End: 2020-01-21
Payer: COMMERCIAL

## 2020-01-21 ENCOUNTER — ANESTHESIA (OUTPATIENT)
Dept: SURGERY | Facility: CLINIC | Age: 45
End: 2020-01-21
Payer: COMMERCIAL

## 2020-01-21 VITALS
BODY MASS INDEX: 22.23 KG/M2 | RESPIRATION RATE: 14 BRPM | OXYGEN SATURATION: 96 % | HEIGHT: 66 IN | WEIGHT: 138.3 LBS | DIASTOLIC BLOOD PRESSURE: 69 MMHG | SYSTOLIC BLOOD PRESSURE: 104 MMHG | TEMPERATURE: 98.1 F | HEART RATE: 94 BPM

## 2020-01-21 DIAGNOSIS — N83.201 CYSTS OF BOTH OVARIES: ICD-10-CM

## 2020-01-21 DIAGNOSIS — M25.559 PAIN IN JOINT INVOLVING PELVIC REGION AND THIGH, UNSPECIFIED LATERALITY: ICD-10-CM

## 2020-01-21 DIAGNOSIS — N83.202 CYSTS OF BOTH OVARIES: ICD-10-CM

## 2020-01-21 DIAGNOSIS — N94.6 DYSMENORRHEA: ICD-10-CM

## 2020-01-21 DIAGNOSIS — Z98.890 S/P LAPAROSCOPIC PROCEDURE: Primary | ICD-10-CM

## 2020-01-21 LAB — B-HCG SERPL-ACNC: <1 IU/L (ref 0–5)

## 2020-01-21 PROCEDURE — 84702 CHORIONIC GONADOTROPIN TEST: CPT | Performed by: OBSTETRICS & GYNECOLOGY

## 2020-01-21 PROCEDURE — 25800030 ZZH RX IP 258 OP 636: Performed by: NURSE ANESTHETIST, CERTIFIED REGISTERED

## 2020-01-21 PROCEDURE — 25000128 H RX IP 250 OP 636: Performed by: OBSTETRICS & GYNECOLOGY

## 2020-01-21 PROCEDURE — 37000009 ZZH ANESTHESIA TECHNICAL FEE, EACH ADDTL 15 MIN: Performed by: OBSTETRICS & GYNECOLOGY

## 2020-01-21 PROCEDURE — 40000170 ZZH STATISTIC PRE-PROCEDURE ASSESSMENT II: Performed by: OBSTETRICS & GYNECOLOGY

## 2020-01-21 PROCEDURE — 71000012 ZZH RECOVERY PHASE 1 LEVEL 1 FIRST HR: Performed by: OBSTETRICS & GYNECOLOGY

## 2020-01-21 PROCEDURE — 36000058 ZZH SURGERY LEVEL 3 EA 15 ADDTL MIN: Performed by: OBSTETRICS & GYNECOLOGY

## 2020-01-21 PROCEDURE — 71000027 ZZH RECOVERY PHASE 2 EACH 15 MINS: Performed by: OBSTETRICS & GYNECOLOGY

## 2020-01-21 PROCEDURE — 25800025 ZZH RX 258: Performed by: OBSTETRICS & GYNECOLOGY

## 2020-01-21 PROCEDURE — 25000125 ZZHC RX 250: Performed by: NURSE ANESTHETIST, CERTIFIED REGISTERED

## 2020-01-21 PROCEDURE — 25000566 ZZH SEVOFLURANE, EA 15 MIN: Performed by: OBSTETRICS & GYNECOLOGY

## 2020-01-21 PROCEDURE — 25000128 H RX IP 250 OP 636: Performed by: ANESTHESIOLOGY

## 2020-01-21 PROCEDURE — 88305 TISSUE EXAM BY PATHOLOGIST: CPT | Mod: 26 | Performed by: OBSTETRICS & GYNECOLOGY

## 2020-01-21 PROCEDURE — 88305 TISSUE EXAM BY PATHOLOGIST: CPT | Performed by: OBSTETRICS & GYNECOLOGY

## 2020-01-21 PROCEDURE — 25000132 ZZH RX MED GY IP 250 OP 250 PS 637: Performed by: OBSTETRICS & GYNECOLOGY

## 2020-01-21 PROCEDURE — 58662 LAPAROSCOPY EXCISE LESIONS: CPT | Performed by: OBSTETRICS & GYNECOLOGY

## 2020-01-21 PROCEDURE — 25000128 H RX IP 250 OP 636: Performed by: NURSE ANESTHETIST, CERTIFIED REGISTERED

## 2020-01-21 PROCEDURE — 36415 COLL VENOUS BLD VENIPUNCTURE: CPT | Performed by: OBSTETRICS & GYNECOLOGY

## 2020-01-21 PROCEDURE — 27210794 ZZH OR GENERAL SUPPLY STERILE: Performed by: OBSTETRICS & GYNECOLOGY

## 2020-01-21 PROCEDURE — 71000013 ZZH RECOVERY PHASE 1 LEVEL 1 EA ADDTL HR: Performed by: OBSTETRICS & GYNECOLOGY

## 2020-01-21 PROCEDURE — 36000056 ZZH SURGERY LEVEL 3 1ST 30 MIN: Performed by: OBSTETRICS & GYNECOLOGY

## 2020-01-21 PROCEDURE — 37000008 ZZH ANESTHESIA TECHNICAL FEE, 1ST 30 MIN: Performed by: OBSTETRICS & GYNECOLOGY

## 2020-01-21 PROCEDURE — 58662 LAPAROSCOPY EXCISE LESIONS: CPT | Mod: 80 | Performed by: OBSTETRICS & GYNECOLOGY

## 2020-01-21 RX ORDER — PROPOFOL 10 MG/ML
INJECTION, EMULSION INTRAVENOUS CONTINUOUS PRN
Status: DISCONTINUED | OUTPATIENT
Start: 2020-01-21 | End: 2020-01-21

## 2020-01-21 RX ORDER — TRANEXAMIC ACID 650 MG/1
1300 TABLET ORAL 3 TIMES DAILY
Status: ON HOLD | COMMUNITY
End: 2020-01-21

## 2020-01-21 RX ORDER — NALOXONE HYDROCHLORIDE 0.4 MG/ML
.1-.4 INJECTION, SOLUTION INTRAMUSCULAR; INTRAVENOUS; SUBCUTANEOUS
Status: DISCONTINUED | OUTPATIENT
Start: 2020-01-21 | End: 2020-01-21 | Stop reason: HOSPADM

## 2020-01-21 RX ORDER — DEXAMETHASONE SODIUM PHOSPHATE 4 MG/ML
INJECTION, SOLUTION INTRA-ARTICULAR; INTRALESIONAL; INTRAMUSCULAR; INTRAVENOUS; SOFT TISSUE PRN
Status: DISCONTINUED | OUTPATIENT
Start: 2020-01-21 | End: 2020-01-21

## 2020-01-21 RX ORDER — ACETAMINOPHEN 325 MG/1
975 TABLET ORAL ONCE
Status: COMPLETED | OUTPATIENT
Start: 2020-01-21 | End: 2020-01-21

## 2020-01-21 RX ORDER — IBUPROFEN 800 MG/1
800 TABLET, FILM COATED ORAL EVERY 8 HOURS PRN
Qty: 60 TABLET | Refills: 0 | Status: SHIPPED | OUTPATIENT
Start: 2020-01-21 | End: 2020-12-14

## 2020-01-21 RX ORDER — EPHEDRINE SULFATE 50 MG/ML
INJECTION, SOLUTION INTRAMUSCULAR; INTRAVENOUS; SUBCUTANEOUS PRN
Status: DISCONTINUED | OUTPATIENT
Start: 2020-01-21 | End: 2020-01-21

## 2020-01-21 RX ORDER — BUPIVACAINE HYDROCHLORIDE 2.5 MG/ML
INJECTION, SOLUTION INFILTRATION; PERINEURAL PRN
Status: DISCONTINUED | OUTPATIENT
Start: 2020-01-21 | End: 2020-01-21 | Stop reason: HOSPADM

## 2020-01-21 RX ORDER — ONDANSETRON 2 MG/ML
4 INJECTION INTRAMUSCULAR; INTRAVENOUS EVERY 30 MIN PRN
Status: DISCONTINUED | OUTPATIENT
Start: 2020-01-21 | End: 2020-01-21 | Stop reason: HOSPADM

## 2020-01-21 RX ORDER — HYDROMORPHONE HYDROCHLORIDE 1 MG/ML
.3-.5 INJECTION, SOLUTION INTRAMUSCULAR; INTRAVENOUS; SUBCUTANEOUS EVERY 10 MIN PRN
Status: DISCONTINUED | OUTPATIENT
Start: 2020-01-21 | End: 2020-01-21 | Stop reason: HOSPADM

## 2020-01-21 RX ORDER — APREPITANT 40 MG/1
40 CAPSULE ORAL DAILY
Status: DISCONTINUED | OUTPATIENT
Start: 2020-01-21 | End: 2020-01-21 | Stop reason: HOSPADM

## 2020-01-21 RX ORDER — SODIUM CHLORIDE, SODIUM LACTATE, POTASSIUM CHLORIDE, CALCIUM CHLORIDE 600; 310; 30; 20 MG/100ML; MG/100ML; MG/100ML; MG/100ML
INJECTION, SOLUTION INTRAVENOUS CONTINUOUS PRN
Status: DISCONTINUED | OUTPATIENT
Start: 2020-01-21 | End: 2020-01-21

## 2020-01-21 RX ORDER — PROPOFOL 10 MG/ML
INJECTION, EMULSION INTRAVENOUS PRN
Status: DISCONTINUED | OUTPATIENT
Start: 2020-01-21 | End: 2020-01-21

## 2020-01-21 RX ORDER — MEPERIDINE HYDROCHLORIDE 25 MG/ML
12.5 INJECTION INTRAMUSCULAR; INTRAVENOUS; SUBCUTANEOUS
Status: DISCONTINUED | OUTPATIENT
Start: 2020-01-21 | End: 2020-01-21 | Stop reason: HOSPADM

## 2020-01-21 RX ORDER — OXYCODONE AND ACETAMINOPHEN 5; 325 MG/1; MG/1
1-2 TABLET ORAL EVERY 4 HOURS PRN
Status: DISCONTINUED | OUTPATIENT
Start: 2020-01-21 | End: 2020-01-21 | Stop reason: HOSPADM

## 2020-01-21 RX ORDER — SODIUM CHLORIDE, SODIUM LACTATE, POTASSIUM CHLORIDE, CALCIUM CHLORIDE 600; 310; 30; 20 MG/100ML; MG/100ML; MG/100ML; MG/100ML
INJECTION, SOLUTION INTRAVENOUS CONTINUOUS
Status: DISCONTINUED | OUTPATIENT
Start: 2020-01-21 | End: 2020-01-21 | Stop reason: HOSPADM

## 2020-01-21 RX ORDER — NEOSTIGMINE METHYLSULFATE 1 MG/ML
VIAL (ML) INJECTION PRN
Status: DISCONTINUED | OUTPATIENT
Start: 2020-01-21 | End: 2020-01-21

## 2020-01-21 RX ORDER — BUPIVACAINE HYDROCHLORIDE 2.5 MG/ML
INJECTION, SOLUTION EPIDURAL; INFILTRATION; INTRACAUDAL
Status: DISCONTINUED
Start: 2020-01-21 | End: 2020-01-21 | Stop reason: HOSPADM

## 2020-01-21 RX ORDER — FENTANYL CITRATE 50 UG/ML
INJECTION, SOLUTION INTRAMUSCULAR; INTRAVENOUS PRN
Status: DISCONTINUED | OUTPATIENT
Start: 2020-01-21 | End: 2020-01-21

## 2020-01-21 RX ORDER — ONDANSETRON 4 MG/1
4 TABLET, ORALLY DISINTEGRATING ORAL EVERY 30 MIN PRN
Status: DISCONTINUED | OUTPATIENT
Start: 2020-01-21 | End: 2020-01-21 | Stop reason: HOSPADM

## 2020-01-21 RX ORDER — OXYCODONE AND ACETAMINOPHEN 5; 325 MG/1; MG/1
1-2 TABLET ORAL EVERY 6 HOURS PRN
Qty: 20 TABLET | Refills: 0 | Status: SHIPPED | OUTPATIENT
Start: 2020-01-21 | End: 2020-01-30

## 2020-01-21 RX ORDER — FENTANYL CITRATE 50 UG/ML
25-50 INJECTION, SOLUTION INTRAMUSCULAR; INTRAVENOUS
Status: DISCONTINUED | OUTPATIENT
Start: 2020-01-21 | End: 2020-01-21 | Stop reason: HOSPADM

## 2020-01-21 RX ORDER — GLYCOPYRROLATE 0.2 MG/ML
INJECTION, SOLUTION INTRAMUSCULAR; INTRAVENOUS PRN
Status: DISCONTINUED | OUTPATIENT
Start: 2020-01-21 | End: 2020-01-21

## 2020-01-21 RX ORDER — KETOROLAC TROMETHAMINE 30 MG/ML
30 INJECTION, SOLUTION INTRAMUSCULAR; INTRAVENOUS EVERY 6 HOURS PRN
Status: DISCONTINUED | OUTPATIENT
Start: 2020-01-21 | End: 2020-01-21 | Stop reason: HOSPADM

## 2020-01-21 RX ORDER — ONDANSETRON 2 MG/ML
INJECTION INTRAMUSCULAR; INTRAVENOUS PRN
Status: DISCONTINUED | OUTPATIENT
Start: 2020-01-21 | End: 2020-01-21

## 2020-01-21 RX ORDER — LIDOCAINE HYDROCHLORIDE 20 MG/ML
INJECTION, SOLUTION INFILTRATION; PERINEURAL PRN
Status: DISCONTINUED | OUTPATIENT
Start: 2020-01-21 | End: 2020-01-21

## 2020-01-21 RX ADMIN — ROCURONIUM BROMIDE 10 MG: 10 INJECTION INTRAVENOUS at 11:15

## 2020-01-21 RX ADMIN — HYDROMORPHONE HYDROCHLORIDE 0.5 MG: 1 INJECTION, SOLUTION INTRAMUSCULAR; INTRAVENOUS; SUBCUTANEOUS at 13:32

## 2020-01-21 RX ADMIN — Medication 5 MG: at 11:11

## 2020-01-21 RX ADMIN — PHENYLEPHRINE HYDROCHLORIDE 100 MCG: 10 INJECTION INTRAVENOUS at 11:09

## 2020-01-21 RX ADMIN — MIDAZOLAM 2 MG: 1 INJECTION INTRAMUSCULAR; INTRAVENOUS at 10:35

## 2020-01-21 RX ADMIN — PROPOFOL 200 MG: 10 INJECTION, EMULSION INTRAVENOUS at 10:39

## 2020-01-21 RX ADMIN — HYDROMORPHONE HYDROCHLORIDE 0.3 MG: 1 INJECTION, SOLUTION INTRAMUSCULAR; INTRAVENOUS; SUBCUTANEOUS at 13:42

## 2020-01-21 RX ADMIN — GLYCOPYRROLATE 0.5 MG: 0.2 INJECTION, SOLUTION INTRAMUSCULAR; INTRAVENOUS at 12:25

## 2020-01-21 RX ADMIN — FENTANYL CITRATE 100 MCG: 50 INJECTION, SOLUTION INTRAMUSCULAR; INTRAVENOUS at 10:39

## 2020-01-21 RX ADMIN — DEXAMETHASONE SODIUM PHOSPHATE 4 MG: 4 INJECTION, SOLUTION INTRA-ARTICULAR; INTRALESIONAL; INTRAMUSCULAR; INTRAVENOUS; SOFT TISSUE at 10:58

## 2020-01-21 RX ADMIN — KETOROLAC TROMETHAMINE 30 MG: 30 INJECTION, SOLUTION INTRAMUSCULAR at 13:08

## 2020-01-21 RX ADMIN — HYDROMORPHONE HYDROCHLORIDE 0.5 MG: 1 INJECTION, SOLUTION INTRAMUSCULAR; INTRAVENOUS; SUBCUTANEOUS at 12:39

## 2020-01-21 RX ADMIN — PHENYLEPHRINE HYDROCHLORIDE 100 MCG: 10 INJECTION INTRAVENOUS at 11:03

## 2020-01-21 RX ADMIN — ROCURONIUM BROMIDE 40 MG: 10 INJECTION INTRAVENOUS at 10:40

## 2020-01-21 RX ADMIN — PHENYLEPHRINE HYDROCHLORIDE 100 MCG: 10 INJECTION INTRAVENOUS at 10:45

## 2020-01-21 RX ADMIN — PHENYLEPHRINE HYDROCHLORIDE 100 MCG: 10 INJECTION INTRAVENOUS at 10:57

## 2020-01-21 RX ADMIN — FENTANYL CITRATE 50 MCG: 0.05 INJECTION, SOLUTION INTRAMUSCULAR; INTRAVENOUS at 13:12

## 2020-01-21 RX ADMIN — PHENYLEPHRINE HYDROCHLORIDE 100 MCG: 10 INJECTION INTRAVENOUS at 10:51

## 2020-01-21 RX ADMIN — PROPOFOL 125 MCG/KG/MIN: 10 INJECTION, EMULSION INTRAVENOUS at 10:43

## 2020-01-21 RX ADMIN — HYDROMORPHONE HYDROCHLORIDE 0.2 MG: 1 INJECTION, SOLUTION INTRAMUSCULAR; INTRAVENOUS; SUBCUTANEOUS at 13:59

## 2020-01-21 RX ADMIN — Medication 5 MG: at 10:48

## 2020-01-21 RX ADMIN — PROPOFOL: 10 INJECTION, EMULSION INTRAVENOUS at 11:50

## 2020-01-21 RX ADMIN — NEOSTIGMINE METHYLSULFATE 3 MG: 1 INJECTION, SOLUTION INTRAVENOUS at 12:25

## 2020-01-21 RX ADMIN — PHENYLEPHRINE HYDROCHLORIDE 100 MCG: 10 INJECTION INTRAVENOUS at 11:07

## 2020-01-21 RX ADMIN — SODIUM CHLORIDE, POTASSIUM CHLORIDE, SODIUM LACTATE AND CALCIUM CHLORIDE: 600; 310; 30; 20 INJECTION, SOLUTION INTRAVENOUS at 11:50

## 2020-01-21 RX ADMIN — LIDOCAINE HYDROCHLORIDE 70 MG: 20 INJECTION, SOLUTION INFILTRATION; PERINEURAL at 10:39

## 2020-01-21 RX ADMIN — SODIUM CHLORIDE, POTASSIUM CHLORIDE, SODIUM LACTATE AND CALCIUM CHLORIDE: 600; 310; 30; 20 INJECTION, SOLUTION INTRAVENOUS at 10:35

## 2020-01-21 RX ADMIN — ACETAMINOPHEN 975 MG: 325 TABLET, FILM COATED ORAL at 09:06

## 2020-01-21 RX ADMIN — ONDANSETRON 4 MG: 2 INJECTION INTRAMUSCULAR; INTRAVENOUS at 14:57

## 2020-01-21 RX ADMIN — ROCURONIUM BROMIDE 10 MG: 10 INJECTION INTRAVENOUS at 11:47

## 2020-01-21 RX ADMIN — ONDANSETRON 4 MG: 2 INJECTION INTRAMUSCULAR; INTRAVENOUS at 12:10

## 2020-01-21 RX ADMIN — OXYCODONE HYDROCHLORIDE AND ACETAMINOPHEN 1 TABLET: 5; 325 TABLET ORAL at 14:21

## 2020-01-21 ASSESSMENT — LIFESTYLE VARIABLES: TOBACCO_USE: 0

## 2020-01-21 ASSESSMENT — MIFFLIN-ST. JEOR: SCORE: 1304.07

## 2020-01-21 NOTE — OP NOTE
Laparoscopic Operative Note     Procedure: L/S left ovarian cystectomy, left paratubal cystectomy, PERRI, fulgaration and excision of stage 2 endometriosis, right ovarian cystectomy of endometrioma  Preop Diagnosis: Pelvic pain, dysmenorrhea, b/l ovarian cysts  Postoperative Diagnosis: same, right endometrioma, stage 2 endometriosis  Surgeon: Delmi West DO  Assist: Rashida Sheth MD  It was necessary to have another physician assistant for aide in retraction, adequate visualization and for added safety of the procedure   Anesthesia: General   EBL: 50mL  IVF: 1300mL  UOP: 100mL  Specimen: endometriosis, left ovarian cysts, right ovarian cyst  Findings: Stage 2 endometriosis. Adhesions of right fallopian tube to uterine fundus. Right ovarian endometrioma. Left simple ovarian cysts x 2. Left paratubal cyst 1cm. Adhesions of right colon to right anterior abdominal wall at site of previous appendectomy. No hydrosalpinx were noted.   Complications: None    Indications: 44-year-old female seen for evaluation of chronic pelvic pain and dysmenorrhea on December 12, 2019.  Patient with long history of dysmenorrhea and pelvic pain, has been on multiple different birth control pills often with unfavorable side effects, and most recently starting in 2018 not controlling her pelvic pain adequately.  Also does history of heavy periods and irregular bleeding.  Has been diagnosed with IBS and fibromyalgia in the past.  Pelvic ultrasound performed January 2, 2020 showed 2.1 cm complex right ovarian cyst with possible hydrosalpinx, 3 cm complex left ovarian cyst.    Procedure:  The patient was taken to the operating room.  She was prepped and draped in a normal sterile fashion in the dorsal lithotomy position under general anesthesia. Monterroso catheter placed. Posterior weighted speculum was placed in vagina and the right angle retractor was used anteriorly in the vagina to visualize the cervix.  Anterior lip of the cervix was grasped  with single-tooth tenaculum and a acorn uterine manipulator placed. Speculum removed. Monterroso catheter placed.     Gloves were changed and attention turned to the abdomen. 1% lidocaine without epi was injected in the infraumbilical fold. A scalpel was then used to make a 11mm incision. A 11mm L/S port was then used to enter the abdomen under direct visualization, and then the abdomen was filled with CO2 gas.  Patient was placed in trendelenburg. An avascular space in the RLQ was then found, transilluminated and a 5mm L/S port placed here while visualizing intra-abdominally.  The same was repeated on the opposite side.  Survey of abdomen and pelvis revealed the above stated findings.      Cystectomy of the two left ovarian cysts performed following entry into cyst with monopolar cautery attached to a hook. Cyst walls removed bluntly and sharply and sent as specimen. Left paratubal cyst also drained and cyst wall removed sharply. Surgicel hemostatic powder inserted into the cyst beds for hemostasis.  Less than 5 minutes of lysis of adhesions performed of the RLQ at site of prior appendectomy using monopolar scissors. Hemostasis noted.   Evaluation of the pelvis showed vesicular, hemorrhagic and powder burn endometriotic lesions. These were on the fallopian tubes, in the posterior cul de sac, on the uterosacral ligaments, and in the ovarian fossa bilaterally. Fulgaration, and excision of endometriosis sites performed. Excised endometriosis sent to pathology as specimen.   The cyst of the right ovary was incised with bipolar hook and found to be endometrioma. The cyst wall was removed sharply and bluntly. The adhesions of the fallopian tube to the uterus were transected. Surgicel powder placed in the bed of the right ovarian cyst for hemostasis.   Surgical sites were reevaluated and noted to be hemostatic.    All L/S and vaginal instruments removed and gas removed. The incisions were closed with 4-0 Monocryl. Monterroso  catheter removed. Patient tolerated the procedure well and brought to recovery in good condition.    Delmi Richards, DO  January 21, 2020  12:39 PM

## 2020-01-21 NOTE — OR NURSING
Declines Emend in that nausea is now resolving; drinking sips of clear liquids without difficulty.  Requesting discharge to home.

## 2020-01-21 NOTE — OR NURSING
Patient resting in recliner, discharge instructions done with parents. Patient states she feels slightly better, would like to continue resting and will notify us when she's ready to discharge.

## 2020-01-21 NOTE — ANESTHESIA CARE TRANSFER NOTE
Patient: Melissa Samuel    Procedure(s):  BILATERAL OVARIAN CYSTECTOMY; EXCISION OF LEFT PERITUBAL CYST; EXCISION AND FULGARATION OF ENDOMETRIOSIS; LYSIS OF ADHESION  ABLATION, ENDOMETRIUM, LAPAROSCOPIC  LAPAROSCOPIC LYSIS OF ADHESION    Diagnosis: Cysts of both ovaries [N83.201, N83.202]  Dysmenorrhea [N94.6]  Pain in joint involving pelvic region and thigh, unspecified laterality [M25.559]  Diagnosis Additional Information: No value filed.    Anesthesia Type:   General, ETT     Note:  Airway :Face Mask  Patient transferred to:PACU  Comments: Neuromuscular blockade reversed after TOF 4/4, spontaneous respirations, adequate tidal volumes, followed commands to voice, extubated atraumatically, extubated with suction, airway patent after extubation.  Oxygen via facemask at 6 liters per minute to PACU. Oxygen tubing connected to wall O2 in PACU, SpO2, NiBP, and EKG monitors and alarms on and functioning, All Hugger warmer connected to patient gown, report on patient's clinical status given to PACU RN, RN questions answered.     BP 96/58  HR 58  RR 20  O2 100%    Handoff Report: Identifed the Patient, Identified the Reponsible Provider, Reviewed the pertinent medical history, Discussed the surgical course, Reviewed Intra-OP anesthesia mangement and issues during anesthesia, Set expectations for post-procedure period and Allowed opportunity for questions and acknowledgement of understanding      Vitals: (Last set prior to Anesthesia Care Transfer)    CRNA VITALS  1/21/2020 1223 - 1/21/2020 1300      1/21/2020             Pulse:  67    SpO2:  99 %    Resp Rate (observed):  11    Resp Rate (set):  10                Electronically Signed By: Christine Marie Volp Hodgkins, CRNA, APRN CRNA  January 21, 2020  1:00 PM

## 2020-01-21 NOTE — ANESTHESIA PREPROCEDURE EVALUATION
Anesthesia Pre-Procedure Evaluation    Patient: Melissa Samuel   MRN: [id number] : 1975          Preoperative Diagnosis: Cysts of both ovaries [N83.201, N83.202]  Dysmenorrhea [N94.6]  Pain in joint involving pelvic region and thigh, unspecified laterality [M25.559]    Procedure(s):  BILATERAL EXCISION, CYST, BENIGN, OVARY, LAPAROSCOPIC  POSSIBLE RIGHT SALPINGECTOMY, LAPAROSCOPIC  ABLATION, ENDOMETRIUM, LAPAROSCOPIC    Past Medical History:   Diagnosis Date     Depression     Off meds since early .      Fistula      Motion sickness      PONV (postoperative nausea and vomiting)      Past Surgical History:   Procedure Laterality Date     APPENDECTOMY        SECTION      x2     COLONOSCOPY      Hx IBS type symptoms, workup for this     FISTULECTOMY RECTUM         Anesthesia Evaluation     . Pt has had prior anesthetic.     History of anesthetic complications   - PONV        ROS/MED HX    ENT/Pulmonary:      (-) tobacco use, asthma and sleep apnea   Neurologic:      (-) CVA and TIA   Cardiovascular:         METS/Exercise Tolerance:     Hematologic:         Musculoskeletal:         GI/Hepatic:        (-) GERD   Renal/Genitourinary:         Endo:         Psychiatric:         Infectious Disease:         Malignancy:         Other:                          Physical Exam  Normal systems: dental    Airway   Mallampati: I  TM distance: >3 FB  Neck ROM: full    Dental     Cardiovascular   Rhythm and rate: regular and normal      Pulmonary    breath sounds clear to auscultation            Lab Results   Component Value Date    WBC 6.7 2018    HGB 12.9 2018    HCT 39.9 2018     2018     2019    POTASSIUM 3.7 2019    CHLORIDE 102 2019    CO2 27 2019    BUN 13 2019    CR 0.65 2019    GLC 75 2019    SAMANTA 9.4 2019    ALBUMIN 4.8 2019    PROTTOTAL 9.0 (H) 2019    ALT 38 2019    AST 24 2019    ALKPHOS  "57 12/12/2019    BILITOTAL 0.5 12/12/2019    TSH 1.90 11/26/2018       Preop Vitals  BP Readings from Last 3 Encounters:   01/21/20 111/70   01/02/20 112/60   10/15/19 104/64    Pulse Readings from Last 3 Encounters:   10/15/19 77   11/20/18 70      Resp Readings from Last 3 Encounters:   01/21/20 16    SpO2 Readings from Last 3 Encounters:   01/21/20 98%   10/15/19 99%   11/20/18 99%      Temp Readings from Last 1 Encounters:   01/21/20 36.7  C (98  F) (Oral)    Ht Readings from Last 1 Encounters:   01/21/20 1.676 m (5' 6\")      Wt Readings from Last 1 Encounters:   01/21/20 62.7 kg (138 lb 4.8 oz)    Estimated body mass index is 22.32 kg/m  as calculated from the following:    Height as of this encounter: 1.676 m (5' 6\").    Weight as of this encounter: 62.7 kg (138 lb 4.8 oz).       Anesthesia Plan      History & Physical Review  History and physical reviewed and following examination; no interval change.    ASA Status:  1 .        Plan for General and ETT with Intravenous induction.   PONV prophylaxis:  Ondansetron (or other 5HT-3) and Dexamethasone or Solumedrol       Postoperative Care  Postoperative pain management:  IV analgesics and Oral pain medications.      Consents  Anesthetic plan, risks, benefits and alternatives discussed with:  Patient..                 Mary Kay Rivera"

## 2020-01-21 NOTE — DISCHARGE INSTRUCTIONS
Today you were given 975 mg of Tylenol at 9:05 am. The recommended daily maximum dose is 4000 mg.       Same Day Surgery Discharge Instructions       It's not unusual to feel dizzy, light-headed or faint for up to 24 hours after surgery or while taking pain medication.  If you have these symptoms: sit for a few minutes before standing and have someone assist you when you get up to walk or use the bathroom.      You should rest and relax for the next 24 hours. You must make arrangements to have someone stay with you for at least 24 hours after your discharge.  Avoid hazardous and strenuous activity.      DO NOT DRIVE any vehicle or operate mechanical equipment for 24 hours following the end of your surgery.  Even though you may feel normal, your reactions may be affected by the medication you have received. Do not drive while taking narcotic pain meds (for example vicodin or percocet).      Do not drink alcoholic beverages for 24 hours following surgery.       It's not unusual to feel nauseated and/or vomit after receiving anesthesia.  If you develop these symptoms, drink clear liquids (apple juice, ginger ale, broth, 7-up, etc. ) until you feel better.  Slowly progress to your regular diet as you feel able.  If your nausea and vomiting persists for 24 hours, please notify your surgeon.        All narcotic pain medications, along with inactivity and anesthesia, can cause constipation. Drinking plenty of liquids and increasing fiber intake will help.       For any questions of a medical nature, call your surgeon.      Do not make important decisions for 24 hours.      If you had general anesthesia, you may have a sore throat for a couple of days related to the breathing tube used during surgery.  You may use Cepacol lozenges to help with this discomfort.  If it worsens or if you develop a fever, contact your surgeon.       If you feel your pain is not well managed with the pain medications prescribed by your  surgeon, please contact your surgeon's office to let them know so they can address your concerns.     HOME CARE FOLLOWING SURGERY  MEDICATIONS:  -May take Ibuprofen (800mg by mouth every 8 hours as needed for pain) or tylenol (500mg by mouth every 6 hours as needed for pain; max 4000mg per day) when at home as needed for pain/cramps/headaches/pain, follow instructions on bottle.  -You may use miralax or docusate (one tab by mouth twice daily) for stool softening, these are over the counter and can be found at any pharmacy. Follow bottle instructions.    Diet  You have no restrictions on your diet.  During the evening following surgery, drink plenty of fluids and eat a light supper.    Nausea  The anesthesia may produce some nausea.  If you feel nauseated, stay in bed and try drinking fluids such as 7-Up, tea, or soup.    Discomfort  The amount of discomfort you can expect is very unpredictable.  If you have pain that cannot be controlled with Tylenol, Ibuprofen or with the prescription you may have received, you should notify your physician.   Abdominal cramping or low backache is not uncommon and should not be a cause for concern.  You will be drowsy and weak the day of surgery and possibly the following day.  You may experience light bleeding, cramping, discharge for 1-2 weeks. This should gradually improve over time.    Fever  A low grade fever (not over 100.4 degree Fahrenheit) is usual after this procedure.  Do not hesitate to notify your physician if your fever seems excessive or persists.    Activity   You may resume your normal activity.  Avoid heavy lifting for two weeks. Avoid vigorous exercise x 2 weeks.     You may shower.  Do not resume intercourse for 2-4 weeks.    Emergency Care  Contact your physician if you have any of these problems:   1.  A fever over 100.4 degree Fahrenheit   2.  A large amount of bleeding or drainage   3.  Severe pain              4.  Foul vaginal discharge    Follow Up  FOLLOW  UP WITH DR. WEST IN CLINIC IN 2-3 WEEKS OR SOONER PRN. Please call for appointment.    Today you received Toradol, an antiinflammatory medication similar to Ibuprofen.  You should not take other antiinflammatory medication, such as Ibuprofen, Motrin, Advil, Aleve, Naprosyn, etc until 7:10pm.     **If you have questions or concerns about your procedure,   call Dr. West at  [phone]**

## 2020-01-21 NOTE — BRIEF OP NOTE
[facility]    Brief Operative Note    Pre-operative diagnosis: Cysts of both ovaries [N83.201, N83.202]  Dysmenorrhea [N94.6]  Pain in joint involving pelvic region and thigh, unspecified laterality [M25.559]  Post-operative diagnosis Right endometrioma, stage 2 endometriosis, adhesions of right colon to right anterior abdominal wall    Procedure: Procedure(s):  BILATERAL EXCISION, CYST, BENIGN, OVARY, LAPAROSCOPIC fugation of left endometrial cyst excision of left peritubal cyst  POSSIBLE RIGHT SALPINGECTOMY, LAPAROSCOPIC  ABLATION, ENDOMETRIUM, LAPAROSCOPIC  Laparoscopic lysis adhesions  Surgeon: Surgeon(s) and Role:     * Delmi West DO - Primary     * Rashida Sheth MD - Assisting  Anesthesia: General   Estimated blood loss: 50mL  Specimens:   ID Type Source Tests Collected by Time Destination   A : LEFT OVARIAN CYST X 2 Tissue Ovary, Left SURGICAL PATHOLOGY EXAM Delmi West DO 1/21/2020 11:51 AM    B : ENDOMETRIOSIS Tissue Endometrium SURGICAL PATHOLOGY EXAM Delmi West DO 1/21/2020 11:55 AM    C : RIGHT OVARIAN CYST Tissue Ovary, Right SURGICAL PATHOLOGY EXAM Delmi West DO 1/21/2020 12:14 PM      Findings:   Stage 2 endometriosis. Adhesions of right fallopian tube to uterine fundus. Right ovarian endometrioma. Left simple ovarian cysts x 2. Left paratubal cyst 1cm. Adhesions of right colon to right anterior abdominal wall at site of previous appendectomy.  Complications: None       Delmi West DO

## 2020-01-21 NOTE — ANESTHESIA POSTPROCEDURE EVALUATION
Patient: Melissa Samuel    Procedure(s):  BILATERAL OVARIAN CYSTECTOMY; EXCISION OF LEFT PERITUBAL CYST; EXCISION AND FULGARATION OF ENDOMETRIOSIS; LYSIS OF ADHESION  ABLATION, ENDOMETRIUM, LAPAROSCOPIC  LAPAROSCOPIC LYSIS OF ADHESION    Diagnosis:Cysts of both ovaries [N83.201, N83.202]  Dysmenorrhea [N94.6]  Pain in joint involving pelvic region and thigh, unspecified laterality [M25.559]  Diagnosis Additional Information: No value filed.    Anesthesia Type:  General, ETT    Note:  Anesthesia Post Evaluation    Patient location during evaluation: PACU  Patient participation: Able to fully participate in evaluation  Level of consciousness: awake  Pain management: adequate  Airway patency: patent  Cardiovascular status: acceptable  Respiratory status: acceptable  Hydration status: acceptable  PONV: none             Last vitals:  Vitals:    01/21/20 1400 01/21/20 1410 01/21/20 1415   BP: 104/74     Pulse: 92     Resp: 16 12 13   Temp:      SpO2: 97%  98%         Electronically Signed By: Mary Kay Rivera  January 21, 2020  2:30 PM

## 2020-01-22 ENCOUNTER — TELEPHONE (OUTPATIENT)
Dept: OBGYN | Facility: CLINIC | Age: 45
End: 2020-01-22

## 2020-01-22 LAB — COPATH REPORT: NORMAL

## 2020-01-22 NOTE — TELEPHONE ENCOUNTER
Called the pt back.  She says that she never  got instructions on wound care post op. Says has small gauze drsg at the belly button site and bandaids on the other 2 sites. There is white tape on the sites also.   Pt instructed to leave the steri strips in place- Will fall off in 7 to 10 days. After showering- pat dry- replace w/ new bandaid.  Okay to pad up the incisions w/ light day pad to prevent clothing from rubbing the sites.   Pt reports that she has a low grade temp- 99.4  Encouraged to increase her fluid intake and take deep breaths and cough to encourage good pulmonary toliet.    Pt verbalizes understanding.

## 2020-01-30 ENCOUNTER — OFFICE VISIT (OUTPATIENT)
Dept: OBGYN | Facility: CLINIC | Age: 45
End: 2020-01-30
Payer: COMMERCIAL

## 2020-01-30 VITALS
SYSTOLIC BLOOD PRESSURE: 114 MMHG | WEIGHT: 137 LBS | BODY MASS INDEX: 22.11 KG/M2 | HEART RATE: 78 BPM | DIASTOLIC BLOOD PRESSURE: 64 MMHG

## 2020-01-30 DIAGNOSIS — N80.9 ENDOMETRIOSIS: Primary | ICD-10-CM

## 2020-01-30 DIAGNOSIS — Z09 POSTOP CHECK: ICD-10-CM

## 2020-01-30 PROCEDURE — 99213 OFFICE O/P EST LOW 20 MIN: CPT | Mod: 24 | Performed by: OBSTETRICS & GYNECOLOGY

## 2020-01-30 PROCEDURE — 99024 POSTOP FOLLOW-UP VISIT: CPT | Performed by: OBSTETRICS & GYNECOLOGY

## 2020-01-30 NOTE — PROGRESS NOTES
SUBJECTIVE:                                                   Melissa Samuel is a 44 year old female who presents to clinic today for the following health issue(s):  Patient presents with:  Surgical Followup: 20 L/S left ovarian cystectomy, left paratubal cystectomy, PERRI, fulgaration and excision of stage 2 endometriosis, right ovarian cystectomy of endometrioma      Additional information: the right incision is still painful and there is hard lump next to the incision    HPI:  Having more soreness, pain in RLQ. Maybe a lump at incision line.   Otherwise, no issues with daily activities. No fever, chills.     Has been thinking about what next to do for medications. Thinking she wants to do OCPs, is good at taking pills. Used to feel tired and foggy on the last OCPs she was on in last couple years. Was on Erlinda and was fine, had issues with the generic, but now is too expensive for name brand. Has not done well on ortho tricyclen.    Patient's last menstrual period was 2020 (exact date)..   Patient is sexually active, .  Using condoms for contraception.    reports that she has never smoked. She has never used smokeless tobacco.    STD testing offered?  Declined    Health maintenance updated:  yes    Today's PHQ-2 Score:   PHQ-2 (  [employer]) 2020   Q1: Little interest or pleasure in doing things 0   Q2: Feeling down, depressed or hopeless 0   PHQ-2 Score 0   Q1: Little interest or pleasure in doing things -   Q2: Feeling down, depressed or hopeless -   PHQ-2 Score -     Today's PHQ-9 Score:   PHQ-9 SCORE 2019   PHQ-9 Total Score 7     Today's GABY-7 Score:   GABY-7 SCORE 2019   Total Score 6       Problem list and histories reviewed & adjusted, as indicated.  Additional history: as documented.    Patient Active Problem List   Diagnosis     Cysts of both ovaries     Dysmenorrhea     Pain in joint involving pelvic region and thigh, unspecified laterality     Past Surgical  History:   Procedure Laterality Date     APPENDECTOMY        SECTION      x2     COLONOSCOPY      Hx IBS type symptoms, workup for this     FISTULECTOMY RECTUM       LAPAROSCOPIC ABLATION ENDOMETRIOSIS N/A 2020    Procedure: ABLATION, ENDOMETRIUM, LAPAROSCOPIC;  Surgeon: Delmi West DO;  Location:  OR     LAPAROSCOPIC CYSTECTOMY OVARIAN (BENIGN) Bilateral 2020    Procedure: BILATERAL OVARIAN CYSTECTOMY; EXCISION OF LEFT PERITUBAL CYST; EXCISION AND FULGARATION OF ENDOMETRIOSIS; LYSIS OF ADHESION;  Surgeon: Delmi West DO;  Location: [facility] OR     LAPAROSCOPIC LYSIS ADHESIONS N/A 2020    Procedure: LAPAROSCOPIC LYSIS OF ADHESION;  Surgeon: Delmi West DO;  Location:  OR      Social History     Tobacco Use     Smoking status: Never Smoker     Smokeless tobacco: Never Used   Substance Use Topics     Alcohol use: Yes     Comment: rarely      Problem (# of Occurrences) Relation (Name,Age of Onset)    Hyperlipidemia (2) Mother, Father    Hypertension (2) Mother, Father    No Known Problems (6) Sister, Brother, Maternal Grandmother, Maternal Grandfather, Paternal Grandmother, Other    Prostate Cancer (1) Father            Current Outpatient Medications   Medication Sig     Ascorbic Acid (VITAMIN C) 500 MG CAPS Take 1 tablet by mouth daily      Docosahexaenoic Acid (DHA) 200 MG capsule Take 200 mg by mouth daily     Fexofenadine HCl (ALLEGRA PO) Take 180 mg by mouth daily     ibuprofen (ADVIL/MOTRIN) 800 MG tablet Take 1 tablet (800 mg) by mouth every 8 hours as needed for moderate pain     Magnesium Oxide 140 MG CAPS Take 1 tablet by mouth daily      norethindrone-ethinyl estradiol (ORTHO-NOVUM 1-35 TAB,NORTREL 1-35 TAB) 1-35 MG-MCG tablet Take 1 tablet by mouth daily Active pills only     Vitamin D, Cholecalciferol, 25 MCG (1000 UT) TABS Take 2 tablets by mouth daily      No current facility-administered medications for this visit.      Allergies    Allergen Reactions     Ciprofloxacin Hives       ROS:  12 point review of systems negative other than symptoms noted below or in the HPI.        OBJECTIVE:     /64   Pulse 78   Wt 62.1 kg (137 lb)   LMP 01/09/2020 (Exact Date)   BMI 22.11 kg/m    Body mass index is 22.11 kg/m .    Exam:  Constitutional:  Appearance: Well nourished, well developed alert, in no acute distress  Chest:  Respiratory Effort:  Breathing unlabored. Gastrointestinal:  Abdominal Examination:  Abdomen nontender to palpation, tone normal without rigidity or guarding, no masses present, umbilicus without lesions; Liver/Spleen:  No hepatomegaly present, liver nontender to palpation; Hernias:  No hernias present, 3 L/S incisions present at umbilicus and b/l LQ's. All well healed. RLQ incision with green/yellow bruising extending in [place] from incision size of apple. Quarter size hematoma below incision, no cellulitis, no drainage, mildly tender.   Skin: General Inspection:  No rashes present, no lesions present, no areas of discoloration.  Neurologic:  Mental Status:  Oriented X3.  Normal strength and tone, sensory exam grossly normal, mentation intact and speech normal.    Psychiatric:  Mentation appears normal and affect normal/bright.       ASSESSMENT/PLAN:                                                        ICD-10-CM    1. Endometriosis N80.9 norethindrone-ethinyl estradiol (ORTHO-NOVUM 1-35 TAB,NORTREL 1-35 TAB) 1-35 MG-MCG tablet   2. Postop check Z09          -Incisions well-healed status post laparoscopy.  Has quarter sized hematoma of the right lower quadrant incision, no cellulitis, incision intact and without drainage.  Large greenish-yellowish bruising over top the incision.  Discussed precautions and when to return.  -Discussed diagnosis of endometriosis.  Went over pictures.  Discussed at length what endometriosis is, how to treat it.  Discussed various different treatment options, risks and benefits.  Ultimately  patient would like to go back on birth control pills.  Will avoid drospirenone containing pills as these seem to not always work well for her in the past.  Discussed proper use.  We will have her start continuous dosing, instructed in how to do this.  Discussed breakthrough bleeding.  Recommend she use backup contraception with condoms for the first pill pack.  Follow up 3 months or sooner as needed.  Questions answered    (10 minutes was spent face to face with the patient today discussing medication management of her endometriosis -her history, diagnosis, and follow-up plan as noted above. Over 50% of the visit was spent in counseling and coordination of care.)      Delmi Calderon Masters,   [facility] [place]

## 2020-02-13 ENCOUNTER — TELEPHONE (OUTPATIENT)
Dept: OBGYN | Facility: CLINIC | Age: 45
End: 2020-02-13

## 2020-02-13 DIAGNOSIS — N94.6 DYSMENORRHEA: Primary | ICD-10-CM

## 2020-02-13 NOTE — TELEPHONE ENCOUNTER
OV with Dr West 1/30/2020 started on OCP  Only on for 2 weeks    Nausea and HA's are daily and difficult to function  Taking Tyl/Ibu but definitely impacting her day to day.  Taking the OCP at bedtime, same time and not missing any doses. Switched to the evening and has helped somewhat on her nausea.   Additionally feeling bloated and knows that is how she felt when taking ortho tricyclen.      1/30/2020-Was on Erlinda and was fine, had issues with the generic, but now is too expensive for name brand. Has not done well on ortho tricyclen.    Routing to Dr West to advise - hold out for a few packs or switch?    Maren Naqvi, RN on 2/13/2020 at 12:52 PM

## 2020-02-13 NOTE — TELEPHONE ENCOUNTER
We can try a different pill with lower estrogen and different progesterone component.   Prescription placed.    Delmi Calderon Masters, DO

## 2020-02-13 NOTE — TELEPHONE ENCOUNTER
Called pt and informed of OCP switch  She will stop her current OCP and start new pack with day 1; back up birth control to complete the pack and may expect some spotting with the switch.    Pt verbalized understanding, in agreement with plan, and voiced no further questions.  Maren Naqvi RN on 2/13/2020 at 1:37 PM

## 2020-02-13 NOTE — TELEPHONE ENCOUNTER
Patient has been having nausea and headache symptoms from this medication: norethindrone-ethinyl estradiol (ORTHO-NOVUM 1-35 TAB,NORTREL 1-35 TAB) 1-35 MG-MCG tablet and was wondering if there was anything she could take for this?

## 2020-03-13 ENCOUNTER — TELEPHONE (OUTPATIENT)
Dept: OBGYN | Facility: CLINIC | Age: 45
End: 2020-03-13

## 2020-03-13 NOTE — TELEPHONE ENCOUNTER
Patient calling regarding changes in her cycle since surgery and being put on new birth control. Please call back to advise.

## 2020-03-13 NOTE — TELEPHONE ENCOUNTER
Breakthrough bleeding is not uncommon when skipping periods and using the birth control pills continuously.   Would keep taking the pills as scheduled, can take the placebo pills when comes to them in this current pack, then start back to active pills only. If the bleeding continues, or worsens, call back.  Delmi Calderon Masters, DO

## 2020-03-13 NOTE — TELEPHONE ENCOUNTER
Pt calling with concerns that she has had a period for 9 days after starting her new continuous BCP (LO/Ovral)    She said she took the first pack- skipped sugar pills and started up the new pack- got her period of heavy bleed for 4 days and the last 4 days has been light but still needs to wear a tampon.   Still had the symptoms of a period- cramping/ bloated/ uncomfortable and feeling very puffy.   One time she said she did take her pill about one hour late but other wise she has been on time and has not forgotten a dose.       Routing to Dr West to advise.          1/21/20 L/S left ovarian cystectomy, left paratubal cystectomy, PERRI, fulgaration and excision of stage 2 endometriosis, right ovarian cystectomy of endometrioma

## 2020-03-16 NOTE — TELEPHONE ENCOUNTER
Left message informing of Dr. West comments. Told to call clinic with further questions.  Gricelda Addison, RN on 3/16/2020 at 9:16 AM

## 2020-05-29 ENCOUNTER — TELEPHONE (OUTPATIENT)
Dept: OBGYN | Facility: CLINIC | Age: 45
End: 2020-05-29

## 2020-05-29 DIAGNOSIS — Z30.011 INITIATION OF OCP (BCP): ICD-10-CM

## 2020-05-29 NOTE — TELEPHONE ENCOUNTER
Requested Prescriptions   Pending Prescriptions Disp Refills     drospirenone-ethinyl estradiol (OCELLA) 3-0.03 MG tablet 90 tablet 3     Sig: Take 1 tablet by mouth daily       There is no refill protocol information for this order        Last Written Prescription Date:  11/20/18  Last Fill Quantity: 90,  # refills: 3   Last office visit: 1/30/2020 with prescribing provider:  Brett  Future Office Visit:      1/30/20   norethindrone-ethinyl estradiol (ORTHO-NOVUM 1-35 TAB,NORTREL 1-35 TAB) 1-35 MG-MCG tablet   Had nausea and Headaches    2/13/20  norgestrel-ethinyl estradiol (LO/OVRAL) 0.3-30 MG-MCG tablet   3/13/20 stated symptoms of a period- cramping/ bloated/ uncomfortable and feeling very puffy. Breakthrough bleeding is not uncommon when skipping periods and using the birth control pills continuously.   Would keep taking the pills as scheduled, can take the placebo pills when comes to them in this current pack, then start back to active pills only. If the bleeding continues, or worsens, call back.    Routing current patient call to provider to advise.  Gricelda Addison, RN on 5/29/2020 at 1:30 PM

## 2020-05-29 NOTE — TELEPHONE ENCOUNTER
Pt is on BC and has tried several different ones and she found an old one she had and has been taking it and seems to be working so she wanted to know if Dr. West can change her to that one. She is taking Ocella . She's been taking it for 4 weeks and has very little side effects.

## 2020-06-01 RX ORDER — DROSPIRENONE AND ETHINYL ESTRADIOL 0.03MG-3MG
1 KIT ORAL DAILY
Qty: 90 TABLET | Refills: 3 | Status: SHIPPED | OUTPATIENT
Start: 2020-06-01 | End: 2020-09-14

## 2020-07-07 ENCOUNTER — TELEPHONE (OUTPATIENT)
Dept: OBGYN | Facility: CLINIC | Age: 45
End: 2020-07-07

## 2020-07-07 NOTE — TELEPHONE ENCOUNTER
Patient asked if she could get her pheriton levels checked because she is having hair loss. Please call back.

## 2020-07-07 NOTE — TELEPHONE ENCOUNTER
Pt has had recent significant hair loss and some fatigue. States in the past when her iron and ferritin have been low these are the symptoms she experiences. Aware her last ferritin level 6 mos ago was normal (51)   Would like to come in for labs. Aware may need a telephone visit to discuss. Will await for Dr. West RTC for recommendations.   Rachel Jacobsen RN on 7/7/2020 at 3:26 PM

## 2020-07-13 NOTE — TELEPHONE ENCOUNTER
I am happy to set up a video or phone visit if she would like to discuss the issue and we can determine appropriate testing.     Delmi Calderon Masters, DO

## 2020-07-13 NOTE — TELEPHONE ENCOUNTER
Called and informed pt of response below.  She is willing to schedule a telehealth visit.  Will have  call pt back to help schedule.    Pt verbalized understanding, in agreement with plan, and voiced no further questions.  Maren Naqvi RN on 7/13/2020 at 2:09 PM

## 2020-11-22 ENCOUNTER — HEALTH MAINTENANCE LETTER (OUTPATIENT)
Age: 45
End: 2020-11-22

## 2020-12-11 NOTE — PROGRESS NOTES
Melissa is a 45 year old  female who presents for annual exam.     Besides routine health maintenance, she has no other health concerns today .    HPI:  The patient doesn't have a PCP.     Since surgery doesn't have the ovarian pain anymore. Across her low abd is always a present crampy sensation, sometimes feels raw. Not disrupting her. Takes ibuprofen and heating pad. BM impacts this discomfort-having a BM makes it better. Used to take MOM capsules.   Taking OCPS continuously. Was having some water retention issues with other types of pills, this one seems ok.     For 20yrs has had issues gladis when exercising with getting air in. Gladis since spring has felt this difficulty getting air in even with getting up to go get a spoon. Some months are worse than others. Sometimes will have chest pains with it. All will spont resolve.   Gets allergy shots.      Needs to schedule mammogram.     Used to get iron infusions for anemia.  Wants hgb checked. Also feels like her hair is thinning on top, which has been a sign for her in the past.    Review of PMH, SocHx, SurHx, FHx, medications completed. Epic updated.      GYNECOLOGIC HISTORY:    No LMP recorded. (Menstrual status: Birth Control).    Regular menses? No, pt takes BC continuously now, no cycles.    Her current contraception method is: oral contraceptives.  She  reports that she has never smoked. She has never used smokeless tobacco.    Patient is sexually active.  STD testing offered?  Declined  Last PHQ-9 score on record =   PHQ-9 SCORE 2020   PHQ-9 Total Score 5     Last GAD7 score on record =   GABY-7 SCORE 2020   Total Score 3     Alcohol Score = 1    HEALTH MAINTENANCE:  Cholesterol:   Recent Labs   Lab Test 18  1127   CHOL 152   HDL 66   LDL 77   TRIG 46     TSH   Date Value Ref Range Status   2018 1.90 0.40 - 4.00 mU/L Final     Last Mammo: One year ago, Result: Normal, Next Mammo:  Needs to schedule  Pap:   Lab Results  "  Component Value Date    PAP NIL, HPV- 2018      Colonoscopy:  Never, Result: Not applicable, Next Colonoscopy: 2 years.  Dexa:  NA    Health maintenance updated: no, due for mammo. Will schedule on way out    HISTORY:  OB History    Para Term  AB Living   3 2 2 0 1 2   SAB TAB Ectopic Multiple Live Births   1 0 0 0 2      # Outcome Date GA Lbr Neo/2nd Weight Sex Delivery Anes PTL Lv   3 Term 14 39w1d  3.615 kg (7 lb 15.5 oz) F CS-Unspec Spinal N ARAM      Name: TSEWART,BABY GIRL      Apgar1: 9  Apgar5: 9   2 Term 12 38w6d  3.065 kg (6 lb 12.1 oz) M CS-Unspec  N ARAM      Birth Comments: C/S for hx of fistulas      Name: Yves Simmons\"\"\"\"      Apgar1: 8  Apgar5: 9   1 SAB 2010               Patient Active Problem List   Diagnosis     Cysts of both ovaries     Dysmenorrhea     Pain in joint involving pelvic region and thigh, unspecified laterality     Past Surgical History:   Procedure Laterality Date     APPENDECTOMY        SECTION      x2     COLONOSCOPY      Hx IBS type symptoms, workup for this     FISTULECTOMY RECTUM       LAPAROSCOPIC ABLATION ENDOMETRIOSIS N/A 2020    Procedure: ABLATION, ENDOMETRIUM, LAPAROSCOPIC;  Surgeon: Delmi West DO;  Location:  OR     LAPAROSCOPIC CYSTECTOMY OVARIAN (BENIGN) Bilateral 2020    Procedure: BILATERAL OVARIAN CYSTECTOMY; EXCISION OF LEFT PERITUBAL CYST; EXCISION AND FULGARATION OF ENDOMETRIOSIS; LYSIS OF ADHESION;  Surgeon: Delmi Wset DO;  Location:  OR     LAPAROSCOPIC LYSIS ADHESIONS N/A 2020    Procedure: LAPAROSCOPIC LYSIS OF ADHESION;  Surgeon: Delmi West DO;  Location:  OR      Social History     Tobacco Use     Smoking status: Never Smoker     Smokeless tobacco: Never Used   Substance Use Topics     Alcohol use: Yes     Comment: rarely      Problem (# of Occurrences) Relation (Name,Age of Onset)    Hyperlipidemia (2) Mother, Father    Hypertension (2) Mother, " "Father    No Known Problems (6) Sister, Brother, Maternal Grandmother, Maternal Grandfather, Paternal Grandmother, Other    Prostate Cancer (1) Father            Current Outpatient Medications   Medication Sig     Ascorbic Acid (VITAMIN C) 500 MG CAPS Take 1 tablet by mouth daily      Docosahexaenoic Acid (DHA) 200 MG capsule Take 200 mg by mouth daily     Fexofenadine HCl (ALLEGRA PO) Take 180 mg by mouth daily     Magnesium Oxide 140 MG CAPS Take 1 tablet by mouth daily      OCELLA 3-0.03 MG tablet Take 1 tablet by mouth daily .  Take active pills continuously.  Skip placebo pills.     Vitamin D, Cholecalciferol, 25 MCG (1000 UT) TABS Take 2 tablets by mouth daily      No current facility-administered medications for this visit.      Allergies   Allergen Reactions     Ciprofloxacin Hives       Past medical, surgical, social and family histories were reviewed and updated in EPIC.    ROS:   12 point review of systems negative other than symptoms noted below or in the HPI.      EXAM:  /70   Pulse 74   Ht 1.676 m (5' 6\")   Wt 66 kg (145 lb 6.4 oz)   BMI 23.47 kg/m     BMI: Body mass index is 23.47 kg/m .    PHYSICAL EXAM:  Constitutional:   Appearance: Well nourished, well developed, alert, in no acute distress  Neck:  Lymph Nodes:  No lymphadenopathy present    Thyroid:  Gland size normal, nontender, no nodules or masses present  on palpation  Chest:  Respiratory Effort:  Breathing unlabored  Cardiovascular:    Heart: Auscultation:  Regular rate, normal rhythm, no murmurs present  Breasts: Inspection of Breasts:  No lymphadenopathy present., Palpation of Breasts and Axillae:  No masses present on palpation, no breast tenderness., Axillary Lymph Nodes:  No lymphadenopathy present. and No nodularity, asymmetry or nipple discharge bilaterally.  Gastrointestinal:   Abdominal Examination:  Abdomen nontender to palpation, tone normal without rigidity or guarding, no masses present, umbilicus without " lesions   Liver and Spleen:  No hepatomegaly present, liver nontender to palpation    Hernias:  No hernias present  Lymphatic: Lymph Nodes:  No other lymphadenopathy present  Skin:  General Inspection:  No rashes present, no lesions present, no areas of  discoloration  Neurologic:    Mental Status:  Oriented X3.  Normal strength and tone, sensory exam                grossly normal, mentation intact and speech normal.    Psychiatric:   Mentation appears normal and affect normal/bright.         Pelvic Exam:  External Genitalia:     Normal appearance for age, no discharge present, no tenderness present, no inflammatory lesions present, color normal  Vagina:     Normal vaginal vault without central or paravaginal defects, no discharge present, no inflammatory lesions present, no masses present  Bladder:     Nontender to palpation  Urethra:   Urethral Body:  Urethra palpation normal, urethra structural support normal   Urethral Meatus:  No erythema or lesions present  Cervix:     Appearance healthy, no lesions present, DIFFUSE PELVIC TTP, no bleeding present  Uterus:     Uterus: firm, normal sized and nontender, midplane in position.   Adnexa:     No adnexal tenderness present, no adnexal masses present  Perineum:     Perineum within normal limits, no evidence of trauma, no rashes or skin lesions present  Anus:     Anus within normal limits, no hemorrhoids present  Inguinal Lymph Nodes:     No lymphadenopathy present  Pubic Hair:     Normal pubic hair distribution for age  Genitalia and Groin:     No rashes present, no lesions present, no areas of discoloration, no masses present      COUNSELING:   Reviewed preventive health counseling, as reflected in patient instructions       Osteoporosis prevention/bone health    BMI: Body mass index is 23.47 kg/m .      ASSESSMENT:  45 year old female with satisfactory annual exam.    ICD-10-CM    1. Encounter for gynecological examination with abnormal finding  Z01.411    2. Female  pelvic pain  R10.2 OCELLA 3-0.03 MG tablet     US Transvaginal Non OB   3. Breathing difficulty  R06.89 PULMONARY MEDICINE REFERRAL   4. History of anemia  Z86.2 Ferritin     CBC with platelets   5. Hair thinning  L65.9 Ferritin     CBC with platelets     TSH with free T4 reflex     Zinc   6. Endometriosis  N80.9 OCELLA 3-0.03 MG tablet     US Transvaginal Non OB       PLAN:  -UTD for cervical cancer screening. Reviewed guidelines-pap q 3yrs until age 30 when co-testing q 5 years.  -Breast self awareness discussed. Will schedule for mammogram.  -Colonoscopy age 47  -Osteoporosis prevention discussed.  -Endometriosis, pelvic pain. Overall much improved since surgery 1/20 however is ongoing despite continuous OCP. Discussed how it works, use for 6-12 mo then going back to hormonal suppression.  Pt willing to try lupron pending determination of cost. Discussed will need prior auth.   Continue continuous OCP for now.  Check pelvic US.   -Difficulty breathing, inspiration. Longstanding history exercise triggered (likely asthma), uncertain reason for recent worsening sx. Normal exam. REc she consult with pulmonologist. Differential includes VTE, however, as symptoms long standing rec pulm eval initially.  -Hair thinning, hx anemia. Will check labs. Orders reviewed with pt.   -Return one year for next annual exam    (15 minutes was spent face to face with the patient today in discussion ADDITIONAL to discussing her history, diagnosis, and follow-up plan as would be typical of annual exam. Over 50% of the visit was spent in counseling and coordination of care.)            Delmi Calderon Masters, DO

## 2020-12-14 ENCOUNTER — OFFICE VISIT (OUTPATIENT)
Dept: OBGYN | Facility: CLINIC | Age: 45
End: 2020-12-14
Payer: COMMERCIAL

## 2020-12-14 VITALS
HEART RATE: 74 BPM | SYSTOLIC BLOOD PRESSURE: 114 MMHG | WEIGHT: 145.4 LBS | HEIGHT: 66 IN | DIASTOLIC BLOOD PRESSURE: 70 MMHG | BODY MASS INDEX: 23.37 KG/M2

## 2020-12-14 DIAGNOSIS — R06.89 BREATHING DIFFICULTY: ICD-10-CM

## 2020-12-14 DIAGNOSIS — R10.2 FEMALE PELVIC PAIN: ICD-10-CM

## 2020-12-14 DIAGNOSIS — N80.9 ENDOMETRIOSIS: ICD-10-CM

## 2020-12-14 DIAGNOSIS — L65.9 HAIR THINNING: ICD-10-CM

## 2020-12-14 DIAGNOSIS — Z86.2 HISTORY OF ANEMIA: ICD-10-CM

## 2020-12-14 DIAGNOSIS — Z01.411 ENCOUNTER FOR GYNECOLOGICAL EXAMINATION WITH ABNORMAL FINDING: Primary | ICD-10-CM

## 2020-12-14 LAB
ERYTHROCYTE [DISTWIDTH] IN BLOOD BY AUTOMATED COUNT: 12.7 % (ref 10–15)
HCT VFR BLD AUTO: 42.6 % (ref 35–47)
HGB BLD-MCNC: 14.1 G/DL (ref 11.7–15.7)
MCH RBC QN AUTO: 30.6 PG (ref 26.5–33)
MCHC RBC AUTO-ENTMCNC: 33.1 G/DL (ref 31.5–36.5)
MCV RBC AUTO: 92 FL (ref 78–100)
PLATELET # BLD AUTO: 244 10E9/L (ref 150–450)
RBC # BLD AUTO: 4.61 10E12/L (ref 3.8–5.2)
WBC # BLD AUTO: 6.7 10E9/L (ref 4–11)

## 2020-12-14 PROCEDURE — 99000 SPECIMEN HANDLING OFFICE-LAB: CPT | Performed by: OBSTETRICS & GYNECOLOGY

## 2020-12-14 PROCEDURE — 84630 ASSAY OF ZINC: CPT | Mod: 90 | Performed by: OBSTETRICS & GYNECOLOGY

## 2020-12-14 PROCEDURE — 82728 ASSAY OF FERRITIN: CPT | Performed by: OBSTETRICS & GYNECOLOGY

## 2020-12-14 PROCEDURE — 85027 COMPLETE CBC AUTOMATED: CPT | Performed by: OBSTETRICS & GYNECOLOGY

## 2020-12-14 PROCEDURE — 99396 PREV VISIT EST AGE 40-64: CPT | Performed by: OBSTETRICS & GYNECOLOGY

## 2020-12-14 PROCEDURE — 36415 COLL VENOUS BLD VENIPUNCTURE: CPT | Performed by: OBSTETRICS & GYNECOLOGY

## 2020-12-14 PROCEDURE — 84443 ASSAY THYROID STIM HORMONE: CPT | Performed by: OBSTETRICS & GYNECOLOGY

## 2020-12-14 RX ORDER — DROSPIRENONE AND ETHINYL ESTRADIOL 0.03MG-3MG
1 KIT ORAL DAILY
Qty: 112 TABLET | Refills: 4 | Status: SHIPPED | OUTPATIENT
Start: 2020-12-14 | End: 2022-01-11

## 2020-12-14 ASSESSMENT — ANXIETY QUESTIONNAIRES
1. FEELING NERVOUS, ANXIOUS, OR ON EDGE: NOT AT ALL
GAD7 TOTAL SCORE: 3
5. BEING SO RESTLESS THAT IT IS HARD TO SIT STILL: NOT AT ALL
IF YOU CHECKED OFF ANY PROBLEMS ON THIS QUESTIONNAIRE, HOW DIFFICULT HAVE THESE PROBLEMS MADE IT FOR YOU TO DO YOUR WORK, TAKE CARE OF THINGS AT HOME, OR GET ALONG WITH OTHER PEOPLE: SOMEWHAT DIFFICULT
3. WORRYING TOO MUCH ABOUT DIFFERENT THINGS: SEVERAL DAYS
2. NOT BEING ABLE TO STOP OR CONTROL WORRYING: SEVERAL DAYS
6. BECOMING EASILY ANNOYED OR IRRITABLE: SEVERAL DAYS
7. FEELING AFRAID AS IF SOMETHING AWFUL MIGHT HAPPEN: NOT AT ALL

## 2020-12-14 ASSESSMENT — PATIENT HEALTH QUESTIONNAIRE - PHQ9
5. POOR APPETITE OR OVEREATING: NOT AT ALL
SUM OF ALL RESPONSES TO PHQ QUESTIONS 1-9: 5

## 2020-12-14 ASSESSMENT — MIFFLIN-ST. JEOR: SCORE: 1331.28

## 2020-12-15 LAB
FERRITIN SERPL-MCNC: 74 NG/ML (ref 12–150)
TSH SERPL DL<=0.005 MIU/L-ACNC: 2.43 MU/L (ref 0.4–4)

## 2020-12-15 ASSESSMENT — ANXIETY QUESTIONNAIRES: GAD7 TOTAL SCORE: 3

## 2020-12-15 NOTE — TELEPHONE ENCOUNTER
RECORDS RECEIVED FROM: Internal   DATE RECEIVED: 12.28.20   NOTES STATUS DETAILS   OFFICE NOTE from referring provider Internal 12.14.20 Brett, [facility]    OFFICE NOTE from other specialist N/A    DISCHARGE SUMMARY from hospital N/A    DISCHARGE REPORT from the ER N/A    MEDICATION LIST Internal    IMAGING  (NEED IMAGES AND REPORTS)     CT SCAN N/A    CHEST XRAY (CXR) N/A    TESTS     PULMONARY FUNCTION TESTING (PFT) N/A

## 2020-12-17 LAB — ZINC SERPL-MCNC: 91.2 UG/DL (ref 60–120)

## 2020-12-18 ENCOUNTER — HOSPITAL ENCOUNTER (OUTPATIENT)
Dept: ULTRASOUND IMAGING | Facility: CLINIC | Age: 45
Discharge: HOME | End: 2020-12-18
Attending: OBSTETRICS & GYNECOLOGY | Admitting: OBSTETRICS & GYNECOLOGY
Payer: COMMERCIAL

## 2020-12-18 DIAGNOSIS — R10.2 FEMALE PELVIC PAIN: ICD-10-CM

## 2020-12-18 DIAGNOSIS — N80.9 ENDOMETRIOSIS: ICD-10-CM

## 2020-12-18 PROCEDURE — 76856 US EXAM PELVIC COMPLETE: CPT

## 2020-12-20 NOTE — PROGRESS NOTES
"Melissa Samuel is a 45 year old female who is being evaluated via a billable video visit.      The patient has been notified of following:     \"This video visit will be conducted via a call between you and your physician/provider. We have found that certain health care needs can be provided without the need for an in-person physical exam.  This service lets us provide the care you need with a video conversation.  If a prescription is necessary we can send it directly to your pharmacy.  If lab work is needed we can place an order for that and you can then stop by our lab to have the test done at a later time.    Video visits are billed at different rates depending on your insurance coverage.  Please reach out to your insurance provider with any questions.    If during the course of the call the physician/provider feels a video visit is not appropriate, you will not be charged for this service.\"    Patient has given verbal consent for Video visit? Yes  How would you like to obtain your AVS? MyChart  If you are dropped from the video visit, the video invite should be resent to: Text to cell phone: [phone]  Will anyone else be joining your video visit? No        Video-Visit Details    Type of service:  Video Visit    Video Start Time: 12:49 PM  Video End Time: 1:37 PM    Originating Location (pt. Location): Home    Distant Location (provider location):  [facility]     Platform used for Video Visit: [facility]    Assessment and Plan:  Melissa Samuel is a 45 year old female with a history of dysmenorrhea, depression, fibromyalgia, and endometriosis who presents for evaluation of shortness of breath.    Dyspnea on exertion: patient with longstanding dyspnea (>20 years), recently slightly worsened. CXR without obvious cause, PFTs normal. Normal BMI. Inspiratory shortness of breath " suggests extra thoracic cause, despite normal flow volume loops, however her prior association with exertion (even when now is more constant) as well as her extensive allergy history suggests that she could have some element of asthma (despite no wheeze or expiratory component). Additionally, symptoms worse lying flat could be due to diaphragmatic paralysis. Lastly, I feel anxiety may be contributing to her symptoms.  -Will trial albuterol PRN for symptoms, counseled on use  -Will also try montelukast nightly for improved allergy control  -Continue allergy shots and allegra.   -she will contact us in 1-3 weeks to let us know if she has had benefit from the above.   -If no benefit, would refer to ENT for upper airway evaluation for VCD or other cause (patient wished to wait on this today until medication trial)  -Would also obtain SNIFF test to look at diaphragm function if no benefit for meds (again, patient wished to wait on this).   -counseled on controlling anxiety and reducing stress. Patient will work on this, but hydroxyzine trial could be considered PRN, or more long term antianxiety med as well. Would defer to PCP  -Encouraged ongoing physical activity and conditioning as able. Goal to get back to pre-COVID weight/conditioning.   -consider exhaled NO testing or methacholine challenge testing  -lastly, could consider CT chest without contrast, if all else is negative, to look for structural cause of inspiratory dyspnea.   -Follow up in 4 months, sooner if needed.     Roberto Alvarez MD   of Medicine  Division of Pulmonary, Critical Care, Allergy, and Sleep Medicine  ____________________________________________________________________  CC: shortness of breath.     HPI:   Melissa Samuel is a 45 year old female with a history of dysmenorrhea, depression, fibromyalgia, and endometriosis who presents for evaluation of shortness of breath.     Ms. Samuel complained of longstanding  "shortness of breath (>20 years), most notable when she exercises. Recently, this has worsened for unclear reasons. She mentioned this to her OB/GYN, who referred her to pulmonary.      Ms. Samuel states for about 15 years, she has had episodes of feeling it was difficult to get air into her lungs. This was intermittent for a long time. More recently (over the last 6 months, but even more so in the last few weeks), it has occurred daily, typically more randomly, but can last most of the day as well. She has no trouble exhaling, but feels it is a trouble getting air in. In the past, it seemed to occur with exertion, but now has no noted triggers. It is worse when she tries to take deeper breaths as well. She notes when she drinks hot tea, it seems to help a bit, as does going out in colder air. She denies any wheezing or squeaking with breathing. She denies any change in voice. She does have a harder time yelling (having enough breath to yet). It seems to continually get worse. She denies any new exposures in the last 6 months, though she did have COVID in November, however she doesn't feel these symptoms changed markedly before or after COVID. She denies cough. She denies sore throat, but does have frequent rhinorrhea and post nasal drip due to allergies.     She notes she is an \"allergic person\" and has gotten allergy shots for the last 3-4 years.     She denies fevers or chills. She does note some \"chest pain\" described as a \"thickness and raw feeling\" with deep breathing, but this improves with exhalation. This is a 3-4/10 in severity. She denies heartburn. No nausea/vomiting. No new myalgias or arthralgias. No skin changes/rashes. No lightheadedness/dizziness.     She does feel this shortness of breath gets in the way of her normal activities. She is slowing her pace and doesn't feel she is breathing normally. She doesn't feel this will limit her distance walked, but she would feel like she is breathing heavier. " She does have some trouble on stairs, and feels she is breathing heavier with this. It takes a couple minutes to catch her breath. Generally, breathing feels worse when reclining or lying flat. But she notes that her breathing feels the best when she first wakes up in the morning.     She also feels she is much more fatigued than previous in the last few days. She is sleeping at night, but is quite tired during the day as well. She generally does feel she sleeps well at night, and is well rested in the morning. Her  states she does not snore nor have apneic episodes. She denies falling asleep unintentionally during the day.     Since about a year ago, she feels she has gained about 10 pounds. She was able to exercise (run) all Spring then through July, then seemed to have more trouble since then with exercise.  She also notes her breathing gets worse when she drinks gin or wine (so she avoids these). She initially did feel that symptoms might have been cyclical (even with periods), but when tracked, this was not the case.     She notes her maternal grandfather had asthma. No other lung disease in family. No autoimmune disease in family.     Exposure History:  Tobacco: never smoker.   Other inhaled substances: No vaping, marijuana, hookah use. No asbestos exposure, sandblasting, welding exposures.   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   Pets: no pets, no birds  Allergies: Currently on weekly allergy shots (3-4 years), with allergies to Spring and Fall pollens (trees, grass, ragweed, mold, dust, dogs, cats).   Hobbies: Enjoys running, golfing, hanging out with family.   Travel: no recent travel.   Home: Lives in a house with  and 2 children. She last had vents cleaned 8 years ago, keeps furnace filter cleaned. No humidifier. No hot tub. No known mold issues or water damage in the home. Uses a  synthetic down comforter. No feather pillow use.     ROS: Complete 10 point ROS negative unless mentioned in HPI    Allergies and current medications: Reviewed and updated in EMR.   Past medical, surgical, social, and family histories: Personally reviewed and updated in EMR during this visit.     Exam:  Constitutional - looks well, in no apparent distress  Eyes - no redness or discharge  Respiratory -breathing appears comfortable.  No cough.  Skin - No appreciable discoloration or lesions (very limited exam)  Neurological - No apparent tremors. Speech fluent and articulate, somewhat nasal voice.   Psychiatric - no signs of delirium or anxiety     Exam limited to that easily identified on a virtual visit. The rest of a comprehensive physical examination is deferred due to PHE (public health emergency) video visit restrictions.    Labs and Radiology: All new data personally reviewed and summarized below. I have reviewed the results with the patient today.   Laboratory data:  12/14/20 TSH 2.42  WBC 6.7 Hgb 14.1 plts 244  Cardiac studies:  None in system  Imaging studies:   12/22/20 CXR: Minimal mid to lower thoracic dextrocurvature, otherwise negative       Pulmonary Function Testing: personally reviewed.   Date and Site FEV1 FVC FEV1/FVC TLC RV DLCO   12/22/20 [facility] 3.33 (106%) 4.06 (104%) 82% 5.82 (110%) 1.7 (98%) 29.47 (127%) corrected   Most recent PFT interpretation: normal spirometry, lung volumes, and diffusion.

## 2020-12-21 ENCOUNTER — TELEPHONE (OUTPATIENT)
Dept: PULMONOLOGY | Facility: CLINIC | Age: 45
End: 2020-12-21

## 2020-12-21 DIAGNOSIS — R06.00 DYSPNEA: Primary | ICD-10-CM

## 2020-12-21 NOTE — TELEPHONE ENCOUNTER
Left message with pt to call me back to set up spirometry and CXR prior to video visit with Dr. Alvarez on 12/28.

## 2020-12-22 ENCOUNTER — ANCILLARY PROCEDURE (OUTPATIENT)
Dept: GENERAL RADIOLOGY | Facility: CLINIC | Age: 45
End: 2020-12-22
Payer: COMMERCIAL

## 2020-12-22 DIAGNOSIS — R06.00 DYSPNEA: ICD-10-CM

## 2020-12-22 PROCEDURE — 94729 DIFFUSING CAPACITY: CPT | Performed by: INTERNAL MEDICINE

## 2020-12-22 PROCEDURE — 94726 PLETHYSMOGRAPHY LUNG VOLUMES: CPT | Performed by: INTERNAL MEDICINE

## 2020-12-22 PROCEDURE — 71046 X-RAY EXAM CHEST 2 VIEWS: CPT | Performed by: RADIOLOGY

## 2020-12-22 PROCEDURE — 94375 RESPIRATORY FLOW VOLUME LOOP: CPT | Performed by: INTERNAL MEDICINE

## 2020-12-23 ENCOUNTER — ANCILLARY PROCEDURE (OUTPATIENT)
Dept: MAMMOGRAPHY | Facility: CLINIC | Age: 45
End: 2020-12-23
Attending: OBSTETRICS & GYNECOLOGY
Payer: COMMERCIAL

## 2020-12-23 DIAGNOSIS — Z12.31 SCREENING MAMMOGRAM, ENCOUNTER FOR: ICD-10-CM

## 2020-12-23 PROCEDURE — 77067 SCR MAMMO BI INCL CAD: CPT | Mod: TC | Performed by: RADIOLOGY

## 2020-12-27 LAB
DLCOCOR-%PRED-PRE: 127 %
DLCOCOR-PRE: 29.47 ML/MIN/MMHG
DLCOUNC-%PRED-PRE: 130 %
DLCOUNC-PRE: 30.08 ML/MIN/MMHG
DLCOUNC-PRED: 23.13 ML/MIN/MMHG
ERV-%PRED-PRE: 105 %
ERV-PRE: 1.23 L
ERV-PRED: 1.16 L
EXPTIME-PRE: 7.02 SEC
FEF2575-%PRED-PRE: 107 %
FEF2575-PRE: 3.43 L/SEC
FEF2575-PRED: 3.19 L/SEC
FEFMAX-%PRED-PRE: 120 %
FEFMAX-PRE: 8.69 L/SEC
FEFMAX-PRED: 7.23 L/SEC
FEV1-%PRED-PRE: 106 %
FEV1-PRE: 3.33 L
FEV1FEV6-PRE: 82 %
FEV1FEV6-PRED: 83 %
FEV1FVC-PRE: 82 %
FEV1FVC-PRED: 81 %
FEV1SVC-PRE: 81 %
FEV1SVC-PRED: 81 %
FIFMAX-PRE: 5.32 L/SEC
FRCPLETH-%PRED-PRE: 104 %
FRCPLETH-PRE: 2.93 L
FRCPLETH-PRED: 2.8 L
FVC-%PRED-PRE: 104 %
FVC-PRE: 4.06 L
FVC-PRED: 3.88 L
IC-%PRED-PRE: 106 %
IC-PRE: 2.89 L
IC-PRED: 2.72 L
RVPLETH-%PRED-PRE: 98 %
RVPLETH-PRE: 1.7 L
RVPLETH-PRED: 1.72 L
TLCPLETH-%PRED-PRE: 110 %
TLCPLETH-PRE: 5.82 L
TLCPLETH-PRED: 5.27 L
VA-%PRED-PRE: 89 %
VA-PRE: 4.76 L
VC-%PRED-PRE: 106 %
VC-PRE: 4.12 L
VC-PRED: 3.88 L

## 2020-12-28 ENCOUNTER — VIRTUAL VISIT (OUTPATIENT)
Dept: PULMONOLOGY | Facility: CLINIC | Age: 45
End: 2020-12-28
Attending: OBSTETRICS & GYNECOLOGY
Payer: COMMERCIAL

## 2020-12-28 ENCOUNTER — PRE VISIT (OUTPATIENT)
Dept: PULMONOLOGY | Facility: CLINIC | Age: 45
End: 2020-12-28

## 2020-12-28 DIAGNOSIS — T78.40XA ALLERGY, INITIAL ENCOUNTER: ICD-10-CM

## 2020-12-28 DIAGNOSIS — R06.02 SHORTNESS OF BREATH: Primary | ICD-10-CM

## 2020-12-28 PROCEDURE — 99204 OFFICE O/P NEW MOD 45 MIN: CPT | Mod: 95 | Performed by: INTERNAL MEDICINE

## 2020-12-28 RX ORDER — ALBUTEROL SULFATE 90 UG/1
2 AEROSOL, METERED RESPIRATORY (INHALATION) EVERY 4 HOURS PRN
Qty: 18 G | Refills: 3 | Status: SHIPPED | OUTPATIENT
Start: 2020-12-28 | End: 2021-01-05

## 2020-12-28 RX ORDER — MONTELUKAST SODIUM 10 MG/1
10 TABLET ORAL EVERY EVENING
Qty: 30 TABLET | Refills: 3 | Status: SHIPPED | OUTPATIENT
Start: 2020-12-28 | End: 2021-01-05

## 2020-12-28 NOTE — LETTER
"    12/28/2020         RE: Melissa Samuel  [address], [place] [address]        Dear Colleague,    Thank you for referring your patient, Melissa Samuel, to the [facility]. Please see a copy of my visit note below.    Melissa Samuel is a 45 year old female who is being evaluated via a billable video visit.      The patient has been notified of following:     \"This video visit will be conducted via a call between you and your physician/provider. We have found that certain health care needs can be provided without the need for an in-person physical exam.  This service lets us provide the care you need with a video conversation.  If a prescription is necessary we can send it directly to your pharmacy.  If lab work is needed we can place an order for that and you can then stop by our lab to have the test done at a later time.    Video visits are billed at different rates depending on your insurance coverage.  Please reach out to your insurance provider with any questions.    If during the course of the call the physician/provider feels a video visit is not appropriate, you will not be charged for this service.\"    Patient has given verbal consent for Video visit? Yes  How would you like to obtain your AVS? MyChart  If you are dropped from the video visit, the video invite should be resent to: Text to cell phone: [phone]  Will anyone else be joining your video visit? No        Video-Visit Details    Type of service:  Video Visit    Video Start Time: 12:49 PM  Video End Time: 1:37 PM    Originating Location (pt. Location): Home    Distant Location (provider location):  [facility]     Platform used for Video Visit: [facility]- General Pulmonary Clinic    Assessment and Plan:  Melissa Samuel is a 45 year old female with a history of " dysmenorrhea, depression, fibromyalgia, and endometriosis who presents for evaluation of shortness of breath.    Dyspnea on exertion: patient with longstanding dyspnea (>20 years), recently slightly worsened. CXR without obvious cause, PFTs normal. Normal BMI. Inspiratory shortness of breath suggests extra thoracic cause, despite normal flow volume loops, however her prior association with exertion (even when now is more constant) as well as her extensive allergy history suggests that she could have some element of asthma (despite no wheeze or expiratory component). Additionally, symptoms worse lying flat could be due to diaphragmatic paralysis. Lastly, I feel anxiety may be contributing to her symptoms.  -Will trial albuterol PRN for symptoms, counseled on use  -Will also try montelukast nightly for improved allergy control  -Continue allergy shots and allegra.   -she will contact us in 1-3 weeks to let us know if she has had benefit from the above.   -If no benefit, would refer to ENT for upper airway evaluation for VCD or other cause (patient wished to wait on this today until medication trial)  -Would also obtain SNIFF test to look at diaphragm function if no benefit for meds (again, patient wished to wait on this).   -counseled on controlling anxiety and reducing stress. Patient will work on this, but hydroxyzine trial could be considered PRN, or more long term antianxiety med as well. Would defer to PCP  -Encouraged ongoing physical activity and conditioning as able. Goal to get back to pre-COVID weight/conditioning.   -consider exhaled NO testing or methacholine challenge testing  -lastly, could consider CT chest without contrast, if all else is negative, to look for structural cause of inspiratory dyspnea.   -Follow up in 4 months, sooner if needed.     Roberto Alvarez MD   of Medicine  Division of Pulmonary, Critical Care, Allergy, and Sleep  "Medicine  ____________________________________________________________________  CC: shortness of breath.     HPI:   Melissa Samuel is a 45 year old female with a history of dysmenorrhea, depression, fibromyalgia, and endometriosis who presents for evaluation of shortness of breath.     Ms. Samuel complained of longstanding shortness of breath (>20 years), most notable when she exercises. Recently, this has worsened for unclear reasons. She mentioned this to her OB/GYN, who referred her to pulmonary.      Ms. Samuel states for about 15 years, she has had episodes of feeling it was difficult to get air into her lungs. This was intermittent for a long time. More recently (over the last 6 months, but even more so in the last few weeks), it has occurred daily, typically more randomly, but can last most of the day as well. She has no trouble exhaling, but feels it is a trouble getting air in. In the past, it seemed to occur with exertion, but now has no noted triggers. It is worse when she tries to take deeper breaths as well. She notes when she drinks hot tea, it seems to help a bit, as does going out in colder air. She denies any wheezing or squeaking with breathing. She denies any change in voice. She does have a harder time yelling (having enough breath to yet). It seems to continually get worse. She denies any new exposures in the last 6 months, though she did have COVID in November, however she doesn't feel these symptoms changed markedly before or after COVID. She denies cough. She denies sore throat, but does have frequent rhinorrhea and post nasal drip due to allergies.     She notes she is an \"allergic person\" and has gotten allergy shots for the last 3-4 years.     She denies fevers or chills. She does note some \"chest pain\" described as a \"thickness and raw feeling\" with deep breathing, but this improves with exhalation. This is a 3-4/10 in severity. She denies heartburn. No nausea/vomiting. No new " myalgias or arthralgias. No skin changes/rashes. No lightheadedness/dizziness.     She does feel this shortness of breath gets in the way of her normal activities. She is slowing her pace and doesn't feel she is breathing normally. She doesn't feel this will limit her distance walked, but she would feel like she is breathing heavier. She does have some trouble on stairs, and feels she is breathing heavier with this. It takes a couple minutes to catch her breath. Generally, breathing feels worse when reclining or lying flat. But she notes that her breathing feels the best when she first wakes up in the morning.     She also feels she is much more fatigued than previous in the last few days. She is sleeping at night, but is quite tired during the day as well. She generally does feel she sleeps well at night, and is well rested in the morning. Her  states she does not snore nor have apneic episodes. She denies falling asleep unintentionally during the day.     Since about a year ago, she feels she has gained about 10 pounds. She was able to exercise (run) all Spring then through July, then seemed to have more trouble since then with exercise.  She also notes her breathing gets worse when she drinks gin or wine (so she avoids these). She initially did feel that symptoms might have been cyclical (even with periods), but when tracked, this was not the case.     She notes her maternal grandfather had asthma. No other lung disease in family. No autoimmune disease in family.     Exposure History:  Tobacco: never smoker.   Other inhaled substances: No vaping, marijuana, hookah use. No asbestos exposure, sandblasting, welding exposures.   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   Pets: no pets, no birds  Allergies: Currently on weekly allergy shots (3-4 years), with allergies to Spring and Fall pollens (trees,  grass, ragweed, mold, dust, dogs, cats).   Hobbies: Enjoys running, golfing, hanging out with family.   Travel: no recent travel.   Home: Lives in a house with  and 2 children. She last had vents cleaned 8 years ago, keeps furnace filter cleaned. No humidifier. No hot tub. No known mold issues or water damage in the home. Uses a synthetic down comforter. No feather pillow use.     ROS: Complete 10 point ROS negative unless mentioned in HPI    Allergies and current medications: Reviewed and updated in EMR.   Past medical, surgical, social, and family histories: Personally reviewed and updated in EMR during this visit.     Exam:  Constitutional - looks well, in no apparent distress  Eyes - no redness or discharge  Respiratory -breathing appears comfortable.  No cough.  Skin - No appreciable discoloration or lesions (very limited exam)  Neurological - No apparent tremors. Speech fluent and articulate, somewhat nasal voice.   Psychiatric - no signs of delirium or anxiety     Exam limited to that easily identified on a virtual visit. The rest of a comprehensive physical examination is deferred due to PHE (public health emergency) video visit restrictions.    Labs and Radiology: All new data personally reviewed and summarized below. I have reviewed the results with the patient today.   Laboratory data:  12/14/20 TSH 2.42  WBC 6.7 Hgb 14.1 plts 244  Cardiac studies:  None in system  Imaging studies:   12/22/20 CXR: Minimal mid to lower thoracic dextrocurvature, otherwise negative       Pulmonary Function Testing: personally reviewed.   Date and Site FEV1 FVC FEV1/FVC TLC RV DLCO   12/22/20 [facility] 3.33 (106%) 4.06 (104%) 82% 5.82 (110%) 1.7 (98%) 29.47 (127%) corrected   Most recent PFT interpretation: normal spirometry, lung volumes, and diffusion.               Again, thank you for allowing me to participate in the care of your patient.        Sincerely,        Roberto Alvarez MD

## 2020-12-28 NOTE — PATIENT INSTRUCTIONS
Welcome to our clinic!    I do not see a specific obvious cause of your symptoms, but there are some possibilities.     We will treat for possible reactive airway disease (asthma-like process) with addition of as needed albuterol inhaler and montelukast (Singulair) nightly. Try these for about 2 weeks, to see if you have benefit. Use the albuterol when you are feeling short of breath, or before activities that make you short of breath.     If these do not work, send us a message or call us. We will refer you to Ear Nose and Throat for upper airway evaluation (upper airway problems often manifest as shortness of breath or tightness when breathing in, rather than when breathing out).     We could also do a fluoroscopy chest x ray to look at diaphragm function at that point.     Work on increasing activity and getting back to prior weight- this may help you feel more conditioned and breath better as well.     Work on controlling stress and anxiety as best you can. This may be contributing as well.     There are other tests we can consider to get to the bottom of this, if all else is negative.     Follow up in 4 months, sooner if needed.

## 2020-12-28 NOTE — LETTER
"12/28/2020      RE: Melissa Samuel  [address] [address]       Melissa Samuel is a 45 year old female who is being evaluated via a billable video visit.      The patient has been notified of following:     \"This video visit will be conducted via a call between you and your physician/provider. We have found that certain health care needs can be provided without the need for an in-person physical exam.  This service lets us provide the care you need with a video conversation.  If a prescription is necessary we can send it directly to your pharmacy.  If lab work is needed we can place an order for that and you can then stop by our lab to have the test done at a later time.    Video visits are billed at different rates depending on your insurance coverage.  Please reach out to your insurance provider with any questions.    If during the course of the call the physician/provider feels a video visit is not appropriate, you will not be charged for this service.\"    Patient has given verbal consent for Video visit? Yes  How would you like to obtain your AVS? MyChart  If you are dropped from the video visit, the video invite should be resent to: Text to cell phone: [phone]  Will anyone else be joining your video visit? No        Video-Visit Details    Type of service:  Video Visit    Video Start Time: 12:49 PM  Video End Time: 1:37 PM    Originating Location (pt. Location): Home    Distant Location (provider location):  [facility]     Platform used for Video Visit: [facility] General Pulmonary Clinic    Assessment and Plan:  Melissa Samuel is a 45 year old female with a history of dysmenorrhea, depression, fibromyalgia, and endometriosis who presents for evaluation of shortness of breath.    Dyspnea on exertion: patient with longstanding dyspnea (>20 years), recently slightly worsened. " CXR without obvious cause, PFTs normal. Normal BMI. Inspiratory shortness of breath suggests extra thoracic cause, despite normal flow volume loops, however her prior association with exertion (even when now is more constant) as well as her extensive allergy history suggests that she could have some element of asthma (despite no wheeze or expiratory component). Additionally, symptoms worse lying flat could be due to diaphragmatic paralysis. Lastly, I feel anxiety may be contributing to her symptoms.  -Will trial albuterol PRN for symptoms, counseled on use  -Will also try montelukast nightly for improved allergy control  -Continue allergy shots and allegra.   -she will contact us in 1-3 weeks to let us know if she has had benefit from the above.   -If no benefit, would refer to ENT for upper airway evaluation for VCD or other cause (patient wished to wait on this today until medication trial)  -Would also obtain SNIFF test to look at diaphragm function if no benefit for meds (again, patient wished to wait on this).   -counseled on controlling anxiety and reducing stress. Patient will work on this, but hydroxyzine trial could be considered PRN, or more long term antianxiety med as well. Would defer to PCP  -Encouraged ongoing physical activity and conditioning as able. Goal to get back to pre-COVID weight/conditioning.   -consider exhaled NO testing or methacholine challenge testing  -lastly, could consider CT chest without contrast, if all else is negative, to look for structural cause of inspiratory dyspnea.   -Follow up in 4 months, sooner if needed.     Roberto Alvarez MD   of Medicine  Division of Pulmonary, Critical Care, Allergy, and Sleep Medicine  ____________________________________________________________________  CC: shortness of breath.     HPI:   Melissa Samuel is a 45 year old female with a history of dysmenorrhea, depression, fibromyalgia, and endometriosis who presents for  "evaluation of shortness of breath.     Ms. Samuel complained of longstanding shortness of breath (>20 years), most notable when she exercises. Recently, this has worsened for unclear reasons. She mentioned this to her OB/GYN, who referred her to pulmonary.      Ms. Samuel states for about 15 years, she has had episodes of feeling it was difficult to get air into her lungs. This was intermittent for a long time. More recently (over the last 6 months, but even more so in the last few weeks), it has occurred daily, typically more randomly, but can last most of the day as well. She has no trouble exhaling, but feels it is a trouble getting air in. In the past, it seemed to occur with exertion, but now has no noted triggers. It is worse when she tries to take deeper breaths as well. She notes when she drinks hot tea, it seems to help a bit, as does going out in colder air. She denies any wheezing or squeaking with breathing. She denies any change in voice. She does have a harder time yelling (having enough breath to yet). It seems to continually get worse. She denies any new exposures in the last 6 months, though she did have COVID in November, however she doesn't feel these symptoms changed markedly before or after COVID. She denies cough. She denies sore throat, but does have frequent rhinorrhea and post nasal drip due to allergies.     She notes she is an \"allergic person\" and has gotten allergy shots for the last 3-4 years.     She denies fevers or chills. She does note some \"chest pain\" described as a \"thickness and raw feeling\" with deep breathing, but this improves with exhalation. This is a 3-4/10 in severity. She denies heartburn. No nausea/vomiting. No new myalgias or arthralgias. No skin changes/rashes. No lightheadedness/dizziness.     She does feel this shortness of breath gets in the way of her normal activities. She is slowing her pace and doesn't feel she is breathing normally. She doesn't feel this " will limit her distance walked, but she would feel like she is breathing heavier. She does have some trouble on stairs, and feels she is breathing heavier with this. It takes a couple minutes to catch her breath. Generally, breathing feels worse when reclining or lying flat. But she notes that her breathing feels the best when she first wakes up in the morning.     She also feels she is much more fatigued than previous in the last few days. She is sleeping at night, but is quite tired during the day as well. She generally does feel she sleeps well at night, and is well rested in the morning. Her  states she does not snore nor have apneic episodes. She denies falling asleep unintentionally during the day.     Since about a year ago, she feels she has gained about 10 pounds. She was able to exercise (run) all Spring then through July, then seemed to have more trouble since then with exercise.  She also notes her breathing gets worse when she drinks gin or wine (so she avoids these). She initially did feel that symptoms might have been cyclical (even with periods), but when tracked, this was not the case.     She notes her maternal grandfather had asthma. No other lung disease in family. No autoimmune disease in family.     Exposure History:  Tobacco: never smoker.   Other inhaled substances: No vaping, marijuana, hookah use. No asbestos exposure, sandblasting, welding exposures.   Occupation: Works as an  at [employer]. Works from home. Prior to this, worked as a  in classroom.   Pets: no pets, no birds  Allergies: Currently on weekly allergy shots (3-4 years), with allergies to Spring and Fall pollens (trees, grass, ragweed, mold, dust, dogs, cats).   Hobbies: Enjoys running, golfing, hanging out with family.   Travel: no recent travel.   Home: Lives in a house with  and 2 children. She last had vents cleaned 8 years ago, keeps furnace filter cleaned. No  humidifier. No hot tub. No known mold issues or water damage in the home. Uses a synthetic down comforter. No feather pillow use.     ROS: Complete 10 point ROS negative unless mentioned in HPI    Allergies and current medications: Reviewed and updated in EMR.   Past medical, surgical, social, and family histories: Personally reviewed and updated in EMR during this visit.     Exam:  Constitutional - looks well, in no apparent distress  Eyes - no redness or discharge  Respiratory -breathing appears comfortable.  No cough.  Skin - No appreciable discoloration or lesions (very limited exam)  Neurological - No apparent tremors. Speech fluent and articulate, somewhat nasal voice.   Psychiatric - no signs of delirium or anxiety     Exam limited to that easily identified on a virtual visit. The rest of a comprehensive physical examination is deferred due to PHE (public health emergency) video visit restrictions.    Labs and Radiology: All new data personally reviewed and summarized below. I have reviewed the results with the patient today.   Laboratory data:  12/14/20 TSH 2.42  WBC 6.7 Hgb 14.1 plts 244  Cardiac studies:  None in system  Imaging studies:   12/22/20 CXR: Minimal mid to lower thoracic dextrocurvature, otherwise negative       Pulmonary Function Testing: personally reviewed.   Date and Site FEV1 FVC FEV1/FVC TLC RV DLCO   12/22/20 [facility] 3.33 (106%) 4.06 (104%) 82% 5.82 (110%) 1.7 (98%) 29.47 (127%) corrected   Most recent PFT interpretation: normal spirometry, lung volumes, and diffusion.               Roberto Alvarez MD

## 2021-01-05 ENCOUNTER — VIRTUAL VISIT (OUTPATIENT)
Dept: FAMILY MEDICINE | Facility: CLINIC | Age: 46
End: 2021-01-05
Payer: COMMERCIAL

## 2021-01-05 ENCOUNTER — TELEPHONE (OUTPATIENT)
Dept: OBGYN | Facility: CLINIC | Age: 46
End: 2021-01-05

## 2021-01-05 DIAGNOSIS — F41.9 ANXIETY: ICD-10-CM

## 2021-01-05 DIAGNOSIS — F32.0 CURRENT MILD EPISODE OF MAJOR DEPRESSIVE DISORDER WITHOUT PRIOR EPISODE (H): Primary | ICD-10-CM

## 2021-01-05 PROCEDURE — 99214 OFFICE O/P EST MOD 30 MIN: CPT | Mod: 95 | Performed by: FAMILY MEDICINE

## 2021-01-05 RX ORDER — DULOXETIN HYDROCHLORIDE 20 MG/1
20 CAPSULE, DELAYED RELEASE ORAL DAILY
Qty: 30 CAPSULE | Refills: 1 | Status: SHIPPED | OUTPATIENT
Start: 2021-01-05 | End: 2021-01-22

## 2021-01-05 NOTE — TELEPHONE ENCOUNTER
Called the patient. States that she did discuss her depression with Dr. Mitchell. She states that she was given prescription and referral. States that she has no plans of hurting herself. States that she has just been dealing with some depression. Nayana Hernandez RN

## 2021-01-05 NOTE — TELEPHONE ENCOUNTER
No . Thank you for letting me know.      Martínez Mitchell MD  [facility], [place]

## 2021-01-05 NOTE — TELEPHONE ENCOUNTER
"Patient had virtual visit today at 4 pm. Also sent back PHQ 9 questionnaire response of Several days\" for better off dead or hurting yourself in some way.    Since just completed virtual visit, would you still like triage to contact the patient?  Nayana Hernandez RN    "

## 2021-01-05 NOTE — PROGRESS NOTES
Melissa Samuel is a 45 year old female who is being evaluated via a billable video visit.      How would you like to obtain your AVS? MyChart  If the video visit is dropped, the invitation should be resent by: Text to cell phone: [phone]  Will anyone else be joining your video visit?  Has been    Video Start Time: 4:43 PM    Assessment & Plan     Current mild episode of major depressive disorder without prior episode (H)  New diagnosis.  Starting patient on Cymbalta 20 mg daily to hopefully improve anxiety, depression as well as body aches.  I did tell her that diagnosis of fibromyalgia is not very clear.  Along with that I recommended her to see psychology.  Referral placed.  I talked to her  during the meeting today as well to make sure she is taking medications, regularly without missing anything and not getting any side effects.  I have instructed them to call me back if any concerns arise.  - DULoxetine (CYMBALTA) 20 MG capsule; Take 1 capsule (20 mg) by mouth daily  - MENTAL HEALTH REFERRAL  - Adult; Outpatient Treatment; Individual/Couples/Family/Group Therapy/Health Psychology; [facility] [place] [phone]; We will contact you to schedule the appointment or please call with any questions    Anxiety  New diagnosis.  Symptoms are not well controlled.  See the plan above.  - DULoxetine (CYMBALTA) 20 MG capsule; Take 1 capsule (20 mg) by mouth daily  - MENTAL HEALTH REFERRAL  - Adult; Outpatient Treatment; Individual/Couples/Family/Group Therapy/Health Psychology; [facility] [place] [phone]; We will contact you to schedule the appointment or please call with any questions6}       Return in about 2 weeks (around 1/19/2021) for Mood Recheck, with a virtual visit.    Martínez Mitchell MD  [facility] CATHY [facility]     Melissa Samuel is a 45 year old who presents to clinic today for the following health issues     HPI       Abnormal  Mood Symptoms  Onset/Duration: ongoing was dx  With depression 20 years ago and is having recurrent symptoms   Description: fatigue, chest tightness, SOB, irritable, prolonged sadness and myalgia   Was seen by a doctor last week at [facility] and has pulmonary test   Depression (if yes, do PHQ-9): YES  Anxiety (if yes, do GABY-7): YES-   Accompanying Signs & Symptoms:  Still participating in activities that you used to enjoy: no  Fatigue: YES  Irritability: YES  Difficulty concentrating: no  Changes in appetite: YES- eating more   Problems with sleep: YES  Heart racing/beating fast: no  Abnormally elevated, expansive, or irritable mood: YES  Persistently increased activity or energy: no  Thoughts of hurting yourself or others: no  History:  Recent stress or major life event: no  Prior depression or anxiety: yes   Family history of depression or anxiety: no  Alcohol/drug use: no  Difficulty sleeping: YES  Precipitating or alleviating factors: None  Therapies tried and outcome:  individual therapy- in fall   PHQ 12/12/2019 12/14/2020 1/5/2021   PHQ-9 Total Score 7 5 21   Q9: Thoughts of better off dead/self-harm past 2 weeks Not at all Not at all Several days   F/U: Thoughts of suicide or self-harm - - No   F/U: Safety concerns - - No     GABY-7 SCORE 12/12/2019 12/14/2020 1/5/2021   Total Score - - 17 (severe anxiety)   Total Score 6 3 17         Review of Systems   CONSTITUTIONAL: NEGATIVE for fever, chills, change in weight  ENT/MOUTH: NEGATIVE for ear, mouth and throat problems  RESP: NEGATIVE for significant cough or SOB  CV: NEGATIVE for chest pain, palpitations or peripheral edema      Objective           Vitals:  No vitals were obtained today due to virtual visit.    Physical Exam   GENERAL: Healthy, alert and no distress  EYES: Eyes grossly normal to inspection.  No discharge or erythema, or obvious scleral/conjunctival abnormalities.  RESP: No audible wheeze, cough, or visible cyanosis.  No visible retractions or  increased work of breathing.    SKIN: Visible skin clear. No significant rash, abnormal pigmentation or lesions.  NEURO: Cranial nerves grossly intact.  Mentation and speech appropriate for age.  PSYCH: Mentation appears normal, affect normal/bright, judgement and insight intact, normal speech and appearance well-groomed.              Video-Visit Details    Type of service:  Video Visit    Video End Time:5:02 PM    Originating Location (pt. Location): Home    Distant Location (provider location):  [facility]     Platform used for Video Visit: "[employer]

## 2021-01-22 ENCOUNTER — VIRTUAL VISIT (OUTPATIENT)
Dept: FAMILY MEDICINE | Facility: CLINIC | Age: 46
End: 2021-01-22
Payer: COMMERCIAL

## 2021-01-22 DIAGNOSIS — F41.9 ANXIETY: ICD-10-CM

## 2021-01-22 DIAGNOSIS — F32.0 CURRENT MILD EPISODE OF MAJOR DEPRESSIVE DISORDER WITHOUT PRIOR EPISODE (H): ICD-10-CM

## 2021-01-22 PROCEDURE — 99213 OFFICE O/P EST LOW 20 MIN: CPT | Mod: 95 | Performed by: FAMILY MEDICINE

## 2021-01-22 RX ORDER — DULOXETIN HYDROCHLORIDE 20 MG/1
40 CAPSULE, DELAYED RELEASE ORAL DAILY
Qty: 60 CAPSULE | Refills: 1 | Status: SHIPPED | OUTPATIENT
Start: 2021-01-22 | End: 2021-02-26

## 2021-01-22 ASSESSMENT — ANXIETY QUESTIONNAIRES
GAD7 TOTAL SCORE: 11
7. FEELING AFRAID AS IF SOMETHING AWFUL MIGHT HAPPEN: SEVERAL DAYS
5. BEING SO RESTLESS THAT IT IS HARD TO SIT STILL: SEVERAL DAYS
6. BECOMING EASILY ANNOYED OR IRRITABLE: MORE THAN HALF THE DAYS
1. FEELING NERVOUS, ANXIOUS, OR ON EDGE: MORE THAN HALF THE DAYS
2. NOT BEING ABLE TO STOP OR CONTROL WORRYING: SEVERAL DAYS
3. WORRYING TOO MUCH ABOUT DIFFERENT THINGS: MORE THAN HALF THE DAYS

## 2021-01-22 ASSESSMENT — PATIENT HEALTH QUESTIONNAIRE - PHQ9
SUM OF ALL RESPONSES TO PHQ QUESTIONS 1-9: 12
5. POOR APPETITE OR OVEREATING: MORE THAN HALF THE DAYS

## 2021-01-22 NOTE — PROGRESS NOTES
Melissa is a 45 year old who is being evaluated via a billable video visit.      How would you like to obtain your AVS? MyChart  If the video visit is dropped, the invitation should be resent by: Text to cell phone: [phone]  Will anyone else be joining your video visit? No      Video Start Time: 12:45 PM  Assessment & Plan     Current mild episode of major depressive disorder without prior episode (H)  Symptoms have improved as compared to before.  Symptoms are now well controlled though.  Recommending to increase the dose of Cymbalta from 20 to 40 mg daily.  Follow-up in 1 month for a recheck  - DULoxetine (CYMBALTA) 20 MG capsule; Take 2 capsules (40 mg) by mouth daily    Anxiety  Symptoms have improved but not well controlled.  Increasing the Cymbalta.  Check  - DULoxetine (CYMBALTA) 20 MG capsule; Take 2 capsules (40 mg) by mouth daily      Return in about 1 month (around 2/22/2021) for Mood Recheck, with a virtual visit.    Martínez Mitchell MD  [facility]    Bambi Torres is a 45 year old who presents to clinic today for the following health issues     HPI       Depression and Anxiety Follow-Up    How are you doing with your depression since your last visit? mild change    How are you doing with your anxiety since your last visit?  mild change    Are you having other symptoms that might be associated with depression or anxiety? Yes:  irritable, fatigue - but less    Have you had a significant life event? No     Do you have any concerns with your use of alcohol or other drugs? No    Social History     Tobacco Use     Smoking status: Never Smoker     Smokeless tobacco: Never Used   Substance Use Topics     Alcohol use: Yes     Comment: rarely     Drug use: No     PHQ 12/14/2020 1/5/2021 1/22/2021   PHQ-9 Total Score 5 21 12   Q9: Thoughts of better off dead/self-harm past 2 weeks Not at all Several days Not at all   F/U: Thoughts of suicide or self-harm - No -   F/U: Safety  concerns - No -     GABY-7 SCORE 12/14/2020 1/5/2021 1/22/2021   Total Score - 17 (severe anxiety) -   Total Score 3 17 11     Last PHQ-9 1/22/2021   1.  Little interest or pleasure in doing things 2   2.  Feeling down, depressed, or hopeless 2   3.  Trouble falling or staying asleep, or sleeping too much 1   4.  Feeling tired or having little energy 1   5.  Poor appetite or overeating 2   6.  Feeling bad about yourself 2   7.  Trouble concentrating 1   8.  Moving slowly or restless 1   Q9: Thoughts of better off dead/self-harm past 2 weeks 0   PHQ-9 Total Score 12   Difficulty at work, home, or with people -   In the past two weeks have you had thoughts of suicide or self harm? -   Do you have concerns about your personal safety or the safety of others? -     GABY-7  1/22/2021   1. Feeling nervous, anxious, or on edge 2   2. Not being able to stop or control worrying 1   3. Worrying too much about different things 2   4. Trouble relaxing 2   5. Being so restless that it is hard to sit still 1   6. Becoming easily annoyed or irritable 2   7. Feeling afraid, as if something awful might happen 1   GABY-7 Total Score 11   If you checked any problems, how difficult have they made it for you to do your work, take care of things at home, or get along with other people? -       Suicide Assessment Five-step Evaluation and Treatment (SAFE-T)            Review of Systems   CONSTITUTIONAL: NEGATIVE for fever, chills, change in weight  ENT/MOUTH: NEGATIVE for ear, mouth and throat problems  RESP: NEGATIVE for significant cough or SOB  CV: NEGATIVE for chest pain, palpitations or peripheral edema      Objective           Vitals:  No vitals were obtained today due to virtual visit.    Physical Exam   GENERAL: Healthy, alert and no distress  EYES: Eyes grossly normal to inspection.  No discharge or erythema, or obvious scleral/conjunctival abnormalities.  RESP: No audible wheeze, cough, or visible cyanosis.  No visible retractions or  increased work of breathing.    SKIN: Visible skin clear. No significant rash, abnormal pigmentation or lesions.  NEURO: Cranial nerves grossly intact.  Mentation and speech appropriate for age.  PSYCH: Mentation appears normal, affect normal/bright, judgement and insight intact, normal speech and appearance well-groomed.                Video-Visit Details    Type of service:  Video Visit    Video End Time:12:55 PM    Originating Location (pt. Location): Home    Distant Location (provider location):  [facility]     Platform used for Video Visit: [employer]

## 2021-01-23 ASSESSMENT — ANXIETY QUESTIONNAIRES: GAD7 TOTAL SCORE: 11

## 2021-02-16 ENCOUNTER — TELEPHONE (OUTPATIENT)
Dept: OBGYN | Facility: CLINIC | Age: 46
End: 2021-02-16

## 2021-02-16 NOTE — TELEPHONE ENCOUNTER
Patient advised and plan also sent to patient for documentation for pt.    Pt verbalized understanding, in agreement with plan, and voiced no further questions.  Maren Naqvi RN on 2/16/2021 at 10:13 AM

## 2021-02-16 NOTE — TELEPHONE ENCOUNTER
Can try OCP taper, 3 pills per day for 3 days, 2 pills per day for 2 days, one pill per day of active pills thereafter.     Recommend follow up in office 1) if she decides not to do the lupron, or 2) two months after getting the first lupron injection.     Delmi Calderon Masters, DO

## 2021-02-16 NOTE — TELEPHONE ENCOUNTER
Takes continuous ocella OCP for endometriosis  Past 6 weeks she has had BTB and asking if she should stop and allow for a period for a week and then return to continuous or will this mess the plan up?  She has been approved for Lupron and   1-2 tampons a day, sometimes stops for 3-4 days and then returns; not super heavy    Routing to Dr West to advise.  She is looking into the Lupron costs prior to proceeding - already been authorized on [facility] side.    Maren Naqvi RN on 2/16/2021 at 9:57 AM

## 2021-02-26 ENCOUNTER — VIRTUAL VISIT (OUTPATIENT)
Dept: FAMILY MEDICINE | Facility: CLINIC | Age: 46
End: 2021-02-26
Payer: COMMERCIAL

## 2021-02-26 DIAGNOSIS — F32.0 CURRENT MILD EPISODE OF MAJOR DEPRESSIVE DISORDER WITHOUT PRIOR EPISODE (H): ICD-10-CM

## 2021-02-26 DIAGNOSIS — F41.9 ANXIETY: ICD-10-CM

## 2021-02-26 PROCEDURE — 99214 OFFICE O/P EST MOD 30 MIN: CPT | Mod: 95 | Performed by: FAMILY MEDICINE

## 2021-02-26 RX ORDER — DULOXETIN HYDROCHLORIDE 60 MG/1
60 CAPSULE, DELAYED RELEASE ORAL DAILY
Qty: 90 CAPSULE | Refills: 0 | Status: SHIPPED | OUTPATIENT
Start: 2021-02-26 | End: 2021-04-09

## 2021-02-26 ASSESSMENT — ANXIETY QUESTIONNAIRES
6. BECOMING EASILY ANNOYED OR IRRITABLE: NEARLY EVERY DAY
5. BEING SO RESTLESS THAT IT IS HARD TO SIT STILL: SEVERAL DAYS
GAD7 TOTAL SCORE: 13
7. FEELING AFRAID AS IF SOMETHING AWFUL MIGHT HAPPEN: SEVERAL DAYS
1. FEELING NERVOUS, ANXIOUS, OR ON EDGE: MORE THAN HALF THE DAYS
3. WORRYING TOO MUCH ABOUT DIFFERENT THINGS: MORE THAN HALF THE DAYS
4. TROUBLE RELAXING: MORE THAN HALF THE DAYS
2. NOT BEING ABLE TO STOP OR CONTROL WORRYING: MORE THAN HALF THE DAYS
7. FEELING AFRAID AS IF SOMETHING AWFUL MIGHT HAPPEN: SEVERAL DAYS
GAD7 TOTAL SCORE: 13

## 2021-02-26 ASSESSMENT — PATIENT HEALTH QUESTIONNAIRE - PHQ9
SUM OF ALL RESPONSES TO PHQ QUESTIONS 1-9: 9
SUM OF ALL RESPONSES TO PHQ QUESTIONS 1-9: 9
10. IF YOU CHECKED OFF ANY PROBLEMS, HOW DIFFICULT HAVE THESE PROBLEMS MADE IT FOR YOU TO DO YOUR WORK, TAKE CARE OF THINGS AT HOME, OR GET ALONG WITH OTHER PEOPLE: SOMEWHAT DIFFICULT

## 2021-02-26 NOTE — PROGRESS NOTES
"Melissa is a 45 year old who is being evaluated via a billable video visit.      How would you like to obtain your AVS? MyChart  If the video visit is dropped, the invitation should be resent by: Text to cell phone: [phone]  Will anyone else be joining your video visit? No    Video Start Time: 11:45 AM    Assessment & Plan     Current mild episode of major depressive disorder without prior episode (H)  Symptoms are not well controlled.  Recommending to increase the dose of Cymbalta from 40 to 60 mg daily.  Patient has responded to Cymbalta as far as depression anxiety and body aches go.  I hope with improvement in the dose symptoms would further improve.  Patient agrees to the plan  - DULoxetine (CYMBALTA) 60 MG capsule; Take 1 capsule (60 mg) by mouth daily    Anxiety  Symptoms are now well controlled. Increasing the dose of cymbalta.     - DULoxetine (CYMBALTA) 60 MG capsule; Take 1 capsule (60 mg) by mouth daily      [id number]}     Return in about 6 weeks (around 4/9/2021) for Mood Recheck, with a virtual visit.    Martínez Mitchell MD  [facility]    Bambi Torres is a 45 year old who presents for the following health issues     HPI       Depression Followup    How are you doing with your depression since your last visit? Has not worsened but is feeling more tired throughout the day and being more irritable. Feels like the Cymbalta is working for her Fibromyalgia.      Are you having other symptoms that might be associated with depression? Yes:  Still feels like she is in a \"hole\" but not as deep anymore    Have you had a significant life event?  No     Are you feeling anxious or having panic attacks?   No    Do you have any concerns with your use of alcohol or other drugs? No    Social History     Tobacco Use     Smoking status: Never Smoker     Smokeless tobacco: Never Used   Substance Use Topics     Alcohol use: Yes     Comment: rarely     Drug use: No     PHQ 1/5/2021 1/22/2021 " 2/26/2021   PHQ-9 Total Score 21 12 9   Q9: Thoughts of better off dead/self-harm past 2 weeks Several days Not at all Not at all   F/U: Thoughts of suicide or self-harm No - -   F/U: Safety concerns No - -   Some encounter information is confidential and restricted. Go to Review [employer] activity to see all data.     GABY-7 SCORE 1/22/2021 2/26/2021 3/10/2021   Total Score - 13 (moderate anxiety) 13 (moderate anxiety)   Total Score 11 13 -   Some encounter information is confidential and restricted. Go to Review [employer] activity to see all data.     Last PHQ-9 2/26/2021   1.  Little interest or pleasure in doing things 1   2.  Feeling down, depressed, or hopeless 1   3.  Trouble falling or staying asleep, or sleeping too much 2   4.  Feeling tired or having little energy 2   5.  Poor appetite or overeating 2   6.  Feeling bad about yourself 1   7.  Trouble concentrating 0   8.  Moving slowly or restless 0   Q9: Thoughts of better off dead/self-harm past 2 weeks 0   PHQ-9 Total Score 9   Difficulty at work, home, or with people -   In the past two weeks have you had thoughts of suicide or self harm? -   Do you have concerns about your personal safety or the safety of others? -   Some encounter information is confidential and restricted. Go to Review [employer] activity to see all data.     GABY-7  2/26/2021   1. Feeling nervous, anxious, or on edge 2   2. Not being able to stop or control worrying 2   3. Worrying too much about different things 2   4. Trouble relaxing 2   5. Being so restless that it is hard to sit still 1   6. Becoming easily annoyed or irritable 3   7. Feeling afraid, as if something awful might happen 1   GABY-7 Total Score 13   If you checked any problems, how difficult have they made it for you to do your work, take care of things at home, or get along with other people? -   Some encounter information is confidential and restricted. Go to Review [employer] activity to see all data.        Suicide Assessment Five-step Evaluation and Treatment (SAFE-T)      How many servings of fruits and vegetables do you eat daily?  4 or more    On average, how many sweetened beverages do you drink each day (Examples: soda, juice, sweet tea, etc.  Do NOT count diet or artificially sweetened beverages)?   0    How many days per week do you exercise enough to make your heart beat faster? 3 or less    How many minutes a day do you exercise enough to make your heart beat faster? 30 - 60    How many days per week do you miss taking your medication? 0      Review of Systems   CONSTITUTIONAL: NEGATIVE for fever, chills, change in weight  ENT/MOUTH: NEGATIVE for ear, mouth and throat problems  RESP: NEGATIVE for significant cough or SOB  CV: NEGATIVE for chest pain, palpitations or peripheral edema      Objective           Vitals:  No vitals were obtained today due to virtual visit.    Physical Exam   GENERAL: Healthy, alert and no distress  EYES: Eyes grossly normal to inspection.  No discharge or erythema, or obvious scleral/conjunctival abnormalities.  RESP: No audible wheeze, cough, or visible cyanosis.  No visible retractions or increased work of breathing.    SKIN: Visible skin clear. No significant rash, abnormal pigmentation or lesions.  NEURO: Cranial nerves grossly intact.  Mentation and speech appropriate for age.  PSYCH: Mentation appears normal, affect normal/bright, judgement and insight intact, normal speech and appearance well-groomed.              Video-Visit Details    Type of service:  Video Visit    Video End Time:11:58 AM    Originating Location (pt. Location): Home    Distant Location (provider location):  [facility]     Platform used for Video Visit: [employer]  Answers for HPI/ROS submitted by the patient on 2/26/2021   GABY 7 TOTAL SCORE: 13  If you checked off any problems, how difficult have these problems made it for you to do your work, take care of things at home, or  get along with other people?: Somewhat difficult  PHQ9 TOTAL SCORE: 9

## 2021-02-27 ASSESSMENT — PATIENT HEALTH QUESTIONNAIRE - PHQ9: SUM OF ALL RESPONSES TO PHQ QUESTIONS 1-9: 9

## 2021-02-27 ASSESSMENT — ANXIETY QUESTIONNAIRES: GAD7 TOTAL SCORE: 13

## 2021-03-10 ENCOUNTER — VIRTUAL VISIT (OUTPATIENT)
Dept: PSYCHOLOGY | Facility: CLINIC | Age: 46
End: 2021-03-10
Attending: FAMILY MEDICINE
Payer: COMMERCIAL

## 2021-03-10 DIAGNOSIS — F41.1 GENERALIZED ANXIETY DISORDER: Primary | ICD-10-CM

## 2021-03-10 PROCEDURE — 90834 PSYTX W PT 45 MINUTES: CPT | Mod: GT

## 2021-03-10 ASSESSMENT — COLUMBIA-SUICIDE SEVERITY RATING SCALE - C-SSRS
2. HAVE YOU ACTUALLY HAD ANY THOUGHTS OF KILLING YOURSELF?: NO
5. HAVE YOU STARTED TO WORK OUT OR WORKED OUT THE DETAILS OF HOW TO KILL YOURSELF? DO YOU INTEND TO CARRY OUT THIS PLAN?: NO
1. IN THE PAST MONTH, HAVE YOU WISHED YOU WERE DEAD OR WISHED YOU COULD GO TO SLEEP AND NOT WAKE UP?: YES
4. HAVE YOU HAD THESE THOUGHTS AND HAD SOME INTENTION OF ACTING ON THEM?: NO
5. HAVE YOU STARTED TO WORK OUT OR WORKED OUT THE DETAILS OF HOW TO KILL YOURSELF? DO YOU INTEND TO CARRY OUT THIS PLAN?: NO
2. HAVE YOU ACTUALLY HAD ANY THOUGHTS OF KILLING YOURSELF LIFETIME?: YES
4. HAVE YOU HAD THESE THOUGHTS AND HAD SOME INTENTION OF ACTING ON THEM?: NO
1. IN THE PAST MONTH, HAVE YOU WISHED YOU WERE DEAD OR WISHED YOU COULD GO TO SLEEP AND NOT WAKE UP?: NO
3. HAVE YOU BEEN THINKING ABOUT HOW YOU MIGHT KILL YOURSELF?: YES

## 2021-03-10 NOTE — PROGRESS NOTES
"                 Progress Note - Initial Visit    Client Name:  Melissa Samuel Date: 3/10/21         Service Type: Individual     Visit Start Time: 1;30 PM  Visit End Time: 2:15 PM    Visit #: 1    Attendees: Client attended alone    Service Modality:  Video Visit:      Provider verified identity through the following two step process.  Patient provided:  Patient address    Telemedicine Visit: The patient's condition can be safely assessed and treated via synchronous audio and visual telemedicine encounter.      Reason for Telemedicine Visit: Services only offered telehealth    Originating Site (Patient Location): Patient's home    Distant Site (Provider Location): Provider Remote Setting    Consent:  The patient/guardian has verbally consented to: the potential risks and benefits of telemedicine (video visit) versus in person care; bill my insurance or make self-payment for services provided; and responsibility for payment of non-covered services.     Patient would like the video invitation sent by:  My Chart    Mode of Communication:  Video Conference via [employer]    As the provider I attest to compliance with applicable laws and regulations related to telemedicine.       DATA:   Interactive Complexity: No   Crisis: No     Presenting Concerns/  Current Stressors:   Current: History of depression, even as a kid. On antidepressants in 20s, was pretty successful, went off due to concerns about Zoloft side effects. Fibromyalgia/digestive/female issues. Depressed since becoming a parent \"was faking it\", started falling into a deeper hole of depression and anxiety. Surgery for ovarian cysts/endometriosis. Pulmonologist referral for breathing-anxiety concern was raised.       ASSESSMENT:  Mental Status Assessment:  Appearance:   Appropriate   Eye Contact:   Fair   Psychomotor Behavior: Normal   Attitude:   Cooperative  Interested  Orientation:   All  Speech   Rate / Production: Normal/ Responsive   Volume:  Normal "   Mood:    Depressed  Normal  Affect:    Appropriate   Thought Content:  Clear   Thought Form:  Coherent  Goal Directed   Insight:    Fair       Safety Issues and Plan for Safety and Risk Management:     Stutsman Suicide Severity Rating Scale (Lifetime/Recent)  Stutsman Suicide Severity Rating (Lifetime/Recent) 3/10/2021   1. Wish to be Dead (Lifetime) Yes   Wish to be Dead Description (Lifetime) early 20s, late 30s-early 40s   1. Wish to be Dead (Recent) No   2. Non-Specific Active Suicidal Thoughts (Lifetime) Yes   2. Non-Specific Active Suicidal Thoughts (Recent) No   3. Active Suicidal Ideation with any Methods (Not Plan) Without Intent to Act (Lifetime) Yes   Active Suicidal Ideation with any Methods (Not Plan) Description (Lifetime) did research   3. Active Suicidal Ideation with any Methods (Not Plan) Without Intent to Act (Recent) No   4. Active Suicidal Ideation with Some Intent to Act, Without Specific Plan (Lifetime) No   4. Active Suicidal Ideation with Some Intent to Act, Without Specific Plan (Recent) No   5. Active Suicidal Ideation with Specific Plan and Intent (Lifetime) No   5. Active Suicidal Ideation with Specific Plan and Intent (Recent) No     Patient denies current fears or concerns for personal safety.  Patient denies current or recent suicidal ideation or behaviors.  Patient denies current or recent homicidal ideation or behaviors.  Patient denies current or recent self injurious behavior or ideation.  Patient denies other safety concerns.  Recommended that patient call 911 or go to the local ED should there be a change in any of these risk factors.  Patient reports there are did not assess.     Diagnostic Criteria:  A. Excessive anxiety and worry about a number of events or activities (such as work or school performance).   B. The person finds it difficult to control the worry.   - Restlessness or feeling keyed up or on edge.    - Being easily fatigued.    - Irritability.   D. The focus of  the anxiety and worry is not confined to features of an Axis I disorder.  E. The anxiety, worry, or physical symptoms cause clinically significant distress or impairment in social, occupational, or other important areas of functioning.   F. The disturbance is not due to the direct physiological effects of a substance (e.g., a drug of abuse, a medication) or a general medical condition (e.g., hyperthyroidism) and does not occur exclusively during a Mood Disorder, a Psychotic Disorder, or a Pervasive Developmental Disorder.      DSM5 Diagnoses: (Sustained by DSM5 Criteria Listed Above)  Diagnoses: 300.02 (F41.1) Generalized Anxiety Disorder  Psychosocial & Contextual Factors: Hx of childhood emotional neglect, low self esteem, depression/anxiety, medical concerns  WHODAS 2.0 (12 item):   WHODAS 2.0 Total Score 3/10/2021   Total Score MyChart 26   Some encounter information is confidential and restricted. Go to Review [employer]she[employer] activity to see all data.     Intervention:   DBT- Patient was educated on distress tolerance skills practiced in session.    CBT: Challenged anxious thoughts regarding job application, considered the worst that could happen by proactively reaching out to hiring person.   Collateral Reports Completed:  Not Applicable      PLAN: (Homework, other):  1. Provider will continue Diagnostic Assessment.  Patient was given the following to do until next session:  Tip skills, reach out regarding job.    2. Provider recommended the following referrals: none at this time.      3.  Safety plan was not created. Patient contracted for safety.       JIGAR Mahoney  March 10, 2021  This note has been reviewed and I agree with the plan of care. This note is co-signed by JADE Valenzuela, [facility], Supervisor, on: 3/17/21

## 2021-03-17 ENCOUNTER — VIRTUAL VISIT (OUTPATIENT)
Dept: PSYCHOLOGY | Facility: CLINIC | Age: 46
End: 2021-03-17
Attending: FAMILY MEDICINE
Payer: COMMERCIAL

## 2021-03-17 DIAGNOSIS — F34.1 DYSTHYMIA: Primary | ICD-10-CM

## 2021-03-17 PROCEDURE — 90791 PSYCH DIAGNOSTIC EVALUATION: CPT | Mod: GT
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
## 2021-04-09 ENCOUNTER — VIRTUAL VISIT (OUTPATIENT)
Dept: FAMILY MEDICINE | Facility: CLINIC | Age: 46
End: 2021-04-09
Payer: COMMERCIAL

## 2021-04-09 DIAGNOSIS — F32.0 CURRENT MILD EPISODE OF MAJOR DEPRESSIVE DISORDER WITHOUT PRIOR EPISODE (H): ICD-10-CM

## 2021-04-09 DIAGNOSIS — F41.9 ANXIETY: ICD-10-CM

## 2021-04-09 PROCEDURE — 99213 OFFICE O/P EST LOW 20 MIN: CPT | Mod: 95 | Performed by: FAMILY MEDICINE

## 2021-04-09 RX ORDER — DULOXETIN HYDROCHLORIDE 60 MG/1
60 CAPSULE, DELAYED RELEASE ORAL DAILY
Qty: 90 CAPSULE | Refills: 0 | Status: SHIPPED | OUTPATIENT
Start: 2021-04-09 | End: 2021-07-09

## 2021-04-09 ASSESSMENT — PATIENT HEALTH QUESTIONNAIRE - PHQ9
5. POOR APPETITE OR OVEREATING: NOT AT ALL
SUM OF ALL RESPONSES TO PHQ QUESTIONS 1-9: 3

## 2021-04-09 ASSESSMENT — ANXIETY QUESTIONNAIRES
6. BECOMING EASILY ANNOYED OR IRRITABLE: SEVERAL DAYS
1. FEELING NERVOUS, ANXIOUS, OR ON EDGE: NOT AT ALL
2. NOT BEING ABLE TO STOP OR CONTROL WORRYING: SEVERAL DAYS
3. WORRYING TOO MUCH ABOUT DIFFERENT THINGS: SEVERAL DAYS
GAD7 TOTAL SCORE: 3
7. FEELING AFRAID AS IF SOMETHING AWFUL MIGHT HAPPEN: NOT AT ALL
5. BEING SO RESTLESS THAT IT IS HARD TO SIT STILL: NOT AT ALL

## 2021-04-09 NOTE — PROGRESS NOTES
Melissa is a 46 year old who is being evaluated via a billable video visit.      How would you like to obtain your AVS? MyChart  If the video visit is dropped, the invitation should be resent by: Text to cell phone: [phone]  Will anyone else be joining your video visit? No    Video Start Time: 11:32 AM    Assessment & Plan     Current mild episode of major depressive disorder without prior episode (H)    Symptoms are very well controlled with the use of Cymbalta 60 mg daily.  Resume the medication use  - DULoxetine (CYMBALTA) 60 MG capsule; Take 1 capsule (60 mg) by mouth daily    Anxiety  Symptoms well managed.  Resume Cymbalta 60 mg daily.  Continue psychotherapy.  Encouraged to exercise regularly.  - DULoxetine (CYMBALTA) 60 MG capsule; Take 1 capsule (60 mg) by mouth daily    956}    Return in about 3 months (around 7/9/2021) for Mood Recheck, with a [facility]    Martínez Mitchell MD  [facility]    Bambi Torres is a 46 year old who presents for the following health issues     HPI     Depression and Anxiety Follow-Up - also seeing a therapist that seems to be helping a lot as well    How are you doing with your depression since your last visit? improved    How are you doing with your anxiety since your last visit?  Improved     Are you having other symptoms that might be associated with depression or anxiety? No    Have you had a significant life event? No     Do you have any concerns with your use of alcohol or other drugs? No    Social History     Tobacco Use     Smoking status: Never Smoker     Smokeless tobacco: Never Used   Substance Use Topics     Alcohol use: Yes     Comment: rarely     Drug use: No     PHQ 1/22/2021 2/26/2021 4/9/2021   PHQ-9 Total Score 12 9 3   Q9: Thoughts of better off dead/self-harm past 2 weeks Not at all Not at all Not at all   F/U: Thoughts of suicide or self-harm - - -   F/U: Safety concerns - - -   Some encounter  information is confidential and restricted. Go to Review Webchutney activity to see all data.     GABY-7 SCORE 2/26/2021 3/10/2021 4/9/2021   Total Score 13 (moderate anxiety) 13 (moderate anxiety) -   Total Score 13 - 3   Some encounter information is confidential and restricted. Go to Review Webchutney activity to see all data.     Last PHQ-9 4/9/2021   1.  Little interest or pleasure in doing things 1   2.  Feeling down, depressed, or hopeless 0   3.  Trouble falling or staying asleep, or sleeping too much 0   4.  Feeling tired or having little energy 1   5.  Poor appetite or overeating 1   6.  Feeling bad about yourself 0   7.  Trouble concentrating 0   8.  Moving slowly or restless 0   Q9: Thoughts of better off dead/self-harm past 2 weeks 0   PHQ-9 Total Score 3   Difficulty at work, home, or with people -   In the past two weeks have you had thoughts of suicide or self harm? -   Do you have concerns about your personal safety or the safety of others? -   Some encounter information is confidential and restricted. Go to Review Webchutney activity to see all data.     GABY-7  4/9/2021   1. Feeling nervous, anxious, or on edge 0   2. Not being able to stop or control worrying 1   3. Worrying too much about different things 1   4. Trouble relaxing 0   5. Being so restless that it is hard to sit still 0   6. Becoming easily annoyed or irritable 1   7. Feeling afraid, as if something awful might happen 0   GABY-7 Total Score 3   If you checked any problems, how difficult have they made it for you to do your work, take care of things at home, or get along with other people? -   Some encounter information is confidential and restricted. Go to Review Webchutney activity to see all data.       Suicide Assessment Five-step Evaluation and Treatment (SAFE-T)          Review of Systems   CONSTITUTIONAL: NEGATIVE for fever, chills, change in weight  ENT/MOUTH: NEGATIVE for ear, mouth and throat problems  RESP: NEGATIVE for  significant cough or SOB  CV: NEGATIVE for chest pain, palpitations or peripheral edema      Objective           Vitals:  No vitals were obtained today due to virtual visit.    Physical Exam   GENERAL: Healthy, alert and no distress  EYES: Eyes grossly normal to inspection.  No discharge or erythema, or obvious scleral/conjunctival abnormalities.  RESP: No audible wheeze, cough, or visible cyanosis.  No visible retractions or increased work of breathing.    SKIN: Visible skin clear. No significant rash, abnormal pigmentation or lesions.  NEURO: Cranial nerves grossly intact.  Mentation and speech appropriate for age.  PSYCH: Mentation appears normal, affect normal/bright, judgement and insight intact, normal speech and appearance well-groomed.                Video-Visit Details    Type of service:  Video Visit    Video End Time:11:39 AM    Originating Location (pt. Location): Home    Distant Location (provider location):  [facility]     Platform used for Video Visit: Pay-Me

## 2021-04-10 ASSESSMENT — ANXIETY QUESTIONNAIRES: GAD7 TOTAL SCORE: 3

## 2021-04-15 ENCOUNTER — VIRTUAL VISIT (OUTPATIENT)
Dept: PSYCHOLOGY | Facility: CLINIC | Age: 46
End: 2021-04-15
Payer: COMMERCIAL

## 2021-04-15 DIAGNOSIS — F34.1 DYSTHYMIA: Primary | ICD-10-CM

## 2021-04-15 PROCEDURE — 90837 PSYTX W PT 60 MINUTES: CPT | Mod: GT
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
## 2021-04-29 ENCOUNTER — VIRTUAL VISIT (OUTPATIENT)
Dept: PSYCHOLOGY | Facility: CLINIC | Age: 46
End: 2021-04-29
Payer: COMMERCIAL

## 2021-04-29 DIAGNOSIS — F41.1 GENERALIZED ANXIETY DISORDER: Primary | ICD-10-CM

## 2021-04-29 PROCEDURE — 90834 PSYTX W PT 45 MINUTES: CPT | Mod: GT
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
## 2021-05-14 ENCOUNTER — VIRTUAL VISIT (OUTPATIENT)
Dept: PSYCHOLOGY | Facility: CLINIC | Age: 46
End: 2021-05-14
Payer: COMMERCIAL

## 2021-05-14 DIAGNOSIS — F41.1 GENERALIZED ANXIETY DISORDER: Primary | ICD-10-CM

## 2021-05-14 PROCEDURE — 90834 PSYTX W PT 45 MINUTES: CPT | Mod: GT

## 2021-05-14 ASSESSMENT — COLUMBIA-SUICIDE SEVERITY RATING SCALE - C-SSRS
2. HAVE YOU ACTUALLY HAD ANY THOUGHTS OF KILLING YOURSELF?: NO
4. HAVE YOU HAD THESE THOUGHTS AND HAD SOME INTENTION OF ACTING ON THEM?: NO
1. IN THE PAST MONTH, HAVE YOU WISHED YOU WERE DEAD OR WISHED YOU COULD GO TO SLEEP AND NOT WAKE UP?: NO
1. IN THE PAST MONTH, HAVE YOU WISHED YOU WERE DEAD OR WISHED YOU COULD GO TO SLEEP AND NOT WAKE UP?: YES
5. HAVE YOU STARTED TO WORK OUT OR WORKED OUT THE DETAILS OF HOW TO KILL YOURSELF? DO YOU INTEND TO CARRY OUT THIS PLAN?: NO
4. HAVE YOU HAD THESE THOUGHTS AND HAD SOME INTENTION OF ACTING ON THEM?: NO
3. HAVE YOU BEEN THINKING ABOUT HOW YOU MIGHT KILL YOURSELF?: YES
5. HAVE YOU STARTED TO WORK OUT OR WORKED OUT THE DETAILS OF HOW TO KILL YOURSELF? DO YOU INTEND TO CARRY OUT THIS PLAN?: NO
2. HAVE YOU ACTUALLY HAD ANY THOUGHTS OF KILLING YOURSELF LIFETIME?: YES
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
## 2021-05-27 ENCOUNTER — VIRTUAL VISIT (OUTPATIENT)
Dept: PSYCHOLOGY | Facility: CLINIC | Age: 46
End: 2021-05-27
Payer: COMMERCIAL

## 2021-05-27 DIAGNOSIS — F41.1 GENERALIZED ANXIETY DISORDER: Primary | ICD-10-CM

## 2021-05-27 PROCEDURE — 90834 PSYTX W PT 45 MINUTES: CPT | Mod: GT
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
## 2021-06-09 ENCOUNTER — VIRTUAL VISIT (OUTPATIENT)
Dept: PSYCHOLOGY | Facility: CLINIC | Age: 46
End: 2021-06-09
Payer: COMMERCIAL

## 2021-06-09 DIAGNOSIS — F41.1 GENERALIZED ANXIETY DISORDER: Primary | ICD-10-CM

## 2021-06-09 PROCEDURE — 90834 PSYTX W PT 45 MINUTES: CPT | Mod: 95
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
## 2021-07-08 ASSESSMENT — ANXIETY QUESTIONNAIRES
1. FEELING NERVOUS, ANXIOUS, OR ON EDGE: SEVERAL DAYS
GAD7 TOTAL SCORE: 2
GAD7 TOTAL SCORE: 2
6. BECOMING EASILY ANNOYED OR IRRITABLE: SEVERAL DAYS
2. NOT BEING ABLE TO STOP OR CONTROL WORRYING: NOT AT ALL
5. BEING SO RESTLESS THAT IT IS HARD TO SIT STILL: NOT AT ALL
GAD7 TOTAL SCORE: 2
4. TROUBLE RELAXING: NOT AT ALL
3. WORRYING TOO MUCH ABOUT DIFFERENT THINGS: NOT AT ALL
7. FEELING AFRAID AS IF SOMETHING AWFUL MIGHT HAPPEN: NOT AT ALL
7. FEELING AFRAID AS IF SOMETHING AWFUL MIGHT HAPPEN: NOT AT ALL

## 2021-07-08 ASSESSMENT — PATIENT HEALTH QUESTIONNAIRE - PHQ9
10. IF YOU CHECKED OFF ANY PROBLEMS, HOW DIFFICULT HAVE THESE PROBLEMS MADE IT FOR YOU TO DO YOUR WORK, TAKE CARE OF THINGS AT HOME, OR GET ALONG WITH OTHER PEOPLE: NOT DIFFICULT AT ALL
SUM OF ALL RESPONSES TO PHQ QUESTIONS 1-9: 3
SUM OF ALL RESPONSES TO PHQ QUESTIONS 1-9: 3

## 2021-07-09 ENCOUNTER — VIRTUAL VISIT (OUTPATIENT)
Dept: FAMILY MEDICINE | Facility: CLINIC | Age: 46
End: 2021-07-09
Payer: COMMERCIAL

## 2021-07-09 DIAGNOSIS — F41.9 ANXIETY: ICD-10-CM

## 2021-07-09 DIAGNOSIS — F32.0 CURRENT MILD EPISODE OF MAJOR DEPRESSIVE DISORDER WITHOUT PRIOR EPISODE (H): Primary | ICD-10-CM

## 2021-07-09 PROCEDURE — 99213 OFFICE O/P EST LOW 20 MIN: CPT | Mod: 95 | Performed by: FAMILY MEDICINE

## 2021-07-09 RX ORDER — DULOXETIN HYDROCHLORIDE 60 MG/1
60 CAPSULE, DELAYED RELEASE ORAL DAILY
Qty: 90 CAPSULE | Refills: 1 | Status: SHIPPED | OUTPATIENT
Start: 2021-07-09 | End: 2021-10-25

## 2021-07-09 ASSESSMENT — PATIENT HEALTH QUESTIONNAIRE - PHQ9: SUM OF ALL RESPONSES TO PHQ QUESTIONS 1-9: 3

## 2021-07-09 ASSESSMENT — ANXIETY QUESTIONNAIRES: GAD7 TOTAL SCORE: 2

## 2021-07-09 NOTE — PROGRESS NOTES
Melissa is a 46 year old who is being evaluated via a billable video visit.      How would you like to obtain your AVS? MyChart  If the video visit is dropped, the invitation should be resent by: Text to cell phone: [phone]   Will anyone else be joining your video visit? No    Video Start Time: 8:37 AM    Assessment & Plan     Current mild episode of major depressive disorder without prior episode (H)  Symptoms are well controlled.  Patient is instructed to resume Cymbalta 60 mg daily regularly  - DULoxetine (CYMBALTA) 60 MG capsule; Take 1 capsule (60 mg) by mouth daily    Anxiety  Symptoms well managed.  - DULoxetine (CYMBALTA) 60 MG capsule; Take 1 capsule (60 mg) by mouth daily        Return in about 6 months (around 1/10/2022) for Mood Recheck, with a video visit ( Maksim or Liya).    Martínez Mitchell MD  [facility]    Bambi Torres is a 46 year old who presents for the following health issues     HPI     Depression Followup    How are you doing with your depression since your last visit? Improved     Are you having other symptoms that might be associated with depression? No    Have you had a significant life event?  No     Are you feeling anxious or having panic attacks?   No    Do you have any concerns with your use of alcohol or other drugs? No    Social History     Tobacco Use     Smoking status: Never Smoker     Smokeless tobacco: Never Used   Substance Use Topics     Alcohol use: Yes     Comment: rarely     Drug use: No     PHQ 2/26/2021 4/9/2021 7/8/2021   PHQ-9 Total Score 9 3 3   Q9: Thoughts of better off dead/self-harm past 2 weeks Not at all Not at all Not at all   F/U: Thoughts of suicide or self-harm - - -   F/U: Safety concerns - - -   Some encounter information is confidential and restricted. Go to Review Flowsheets activity to see all data.     GABY-7 SCORE 3/10/2021 4/9/2021 7/8/2021   Total Score 13 (moderate anxiety) - 2 (minimal anxiety)   Total Score  - 3 2   Some encounter information is confidential and restricted. Go to Review Flowsheets activity to see all data.     Last PHQ-9 7/8/2021   1.  Little interest or pleasure in doing things 1   2.  Feeling down, depressed, or hopeless 0   3.  Trouble falling or staying asleep, or sleeping too much 0   4.  Feeling tired or having little energy 1   5.  Poor appetite or overeating 0   6.  Feeling bad about yourself 1   7.  Trouble concentrating 0   8.  Moving slowly or restless 0   Q9: Thoughts of better off dead/self-harm past 2 weeks 0   PHQ-9 Total Score 3   Difficulty at work, home, or with people -   In the past two weeks have you had thoughts of suicide or self harm? -   Do you have concerns about your personal safety or the safety of others? -   Some encounter information is confidential and restricted. Go to Review Washio activity to see all data.     GABY-7  7/8/2021   1. Feeling nervous, anxious, or on edge 1   2. Not being able to stop or control worrying 0   3. Worrying too much about different things 0   4. Trouble relaxing 0   5. Being so restless that it is hard to sit still 0   6. Becoming easily annoyed or irritable 1   7. Feeling afraid, as if something awful might happen 0   GABY-7 Total Score 2   If you checked any problems, how difficult have they made it for you to do your work, take care of things at home, or get along with other people? -   Some encounter information is confidential and restricted. Go to Review Washio activity to see all data.       Suicide Assessment Five-step Evaluation and Treatment (SAFE-T)            Review of Systems   CONSTITUTIONAL: NEGATIVE for fever, chills, change in weight  ENT/MOUTH: NEGATIVE for ear, mouth and throat problems  RESP: NEGATIVE for significant cough or SOB  CV: NEGATIVE for chest pain, palpitations or peripheral edema      Objective           Vitals:  No vitals were obtained today due to virtual visit.    Physical Exam   GENERAL: Healthy, alert  and no distress  EYES: Eyes grossly normal to inspection.  No discharge or erythema, or obvious scleral/conjunctival abnormalities.  RESP: No audible wheeze, cough, or visible cyanosis.  No visible retractions or increased work of breathing.    SKIN: Visible skin clear. No significant rash, abnormal pigmentation or lesions.  NEURO: Cranial nerves grossly intact.  Mentation and speech appropriate for age.  PSYCH: Mentation appears normal, affect normal/bright, judgement and insight intact, normal speech and appearance well-groomed.                Video-Visit Details    Type of service:  Video Visit    Video End Time:8:43 AM    Originating Location (pt. Location): Home    Distant Location (provider location):  [facility]     Platform used for Video Visit: iCIMS

## 2021-09-18 ENCOUNTER — HEALTH MAINTENANCE LETTER (OUTPATIENT)
Age: 46
End: 2021-09-18

## 2022-01-10 DIAGNOSIS — N80.9 ENDOMETRIOSIS: ICD-10-CM

## 2022-01-10 DIAGNOSIS — R10.2 FEMALE PELVIC PAIN: ICD-10-CM

## 2022-01-11 RX ORDER — DROSPIRENONE AND ETHINYL ESTRADIOL 0.03MG-3MG
KIT ORAL
Qty: 28 TABLET | Refills: 0 | Status: SHIPPED | OUTPATIENT
Start: 2022-01-11 | End: 2022-01-14

## 2022-01-11 NOTE — TELEPHONE ENCOUNTER
"Requested Prescriptions   Pending Prescriptions Disp Refills     OCELLA 3-0.03 MG tablet [Pharmacy Med Name: OCELLA TABLETS 28] 112 tablet 4     Sig: TAKE 1 TABLET BY MOUTH DAILY. ONLY ACTIVE PILLS CONTINOUSLY AND SKIP PLACEBO       Contraceptives Protocol Passed - 1/10/2022  5:56 PM        Passed - Patient is not a current smoker if age is 35 or older        Passed - Recent (12 mo) or future (30 days) visit within the authorizing provider's specialty     Patient has had an office visit with the authorizing provider or a provider within the authorizing providers department within the previous 12 mos or has a future within next 30 days. See \"Patient Info\" tab in inbasket, or \"Choose Columns\" in Meds & Orders section of the refill encounter.              Passed - Medication is active on med list        Passed - No active pregnancy on record        Passed - No positive pregnancy test in past 12 months           Next 5 appointments (look out 90 days)    Jan 14, 2022  2:00 PM  PHYSICAL with Delmi Calderon Masters, DO  [facility] [place] ([facility] ) [address] [place] [id number] [phone]        Prescription approved per [employer] Refill Protocol.  Rachel Jacobsen RN on 1/11/2022 at 10:20 AM    "

## 2022-01-13 RX ORDER — DULOXETIN HYDROCHLORIDE 20 MG/1
CAPSULE, DELAYED RELEASE ORAL
COMMUNITY
Start: 2021-11-05 | End: 2023-01-30

## 2022-01-13 NOTE — PROGRESS NOTES
Melissa is a 46 year old  female who presents for annual exam.    Mammo, breast, and pelvic.     HPI:  The patient's PCP is Delmi West, DO.     Started following with PCP for mental health issues which were causing her breathing issues. Took it until this month and then stopped. Was feeling good. Tough coming off. Felt she was doing better and wanted to try off . Had gained wt.     LIkes the ocella. Feels like she is doing well.   Eating and eating certain things seems to make her abd worse.  Uses MOM daily.     Never did the lupron due to lack of insurance coverage.    GYNECOLOGIC HISTORY:    Patient's last menstrual period was 2020.    Regular menses? no  Menses every 365 days.  Length of menses: 7 days    Her current contraception method is: oral contraceptives.  She  reports that she has never smoked. She has never used smokeless tobacco.  Patient is sexually active.  STD testing offered?  Declined     Answers for HPI/ROS submitted by the patient on 2022  If you checked off any problems, how difficult have these problems made it for you to do your work, take care of things at home, or get along with other people?: Not difficult at all  PHQ9 TOTAL SCORE: 2  GABY 7 TOTAL SCORE: 2    HEALTH MAINTENANCE:    Cholesterol:   Cholesterol   Date Value Ref Range Status   2018 152 <200 mg/dL Final      Last Mammo: One year ago, Result: Normal, Next Mammo: Today     Pap:   Lab Results   Component Value Date    PAP NIL 2018      Colonoscopy: N/A, Result: Not applicable  Dexa: N/A     Health maintenance updated:  yes    HISTORY:  OB History    Para Term  AB Living   3 2 2 0 1 2   SAB IAB Ectopic Multiple Live Births   1 0 0 0 2      # Outcome Date GA Lbr Neo/2nd Weight Sex Delivery Anes PTL Lv   3 Term 14 39w1d  3.615 kg (7 lb 15.5 oz) F CS-Unspec Spinal N ARAM      Name: STEWART,BABY GIRL      Apgar1: 9  Apgar5: 9   2 Term 12 38w6d  3.065 kg (6 lb 12.1 oz) M  "CS-Unspec  N ARAM      Birth Comments: C/S for hx of fistulas      Name: Yves Simmons\"\"\"\"      Apgar1: 8  Apgar5: 9   1 2010               Patient Active Problem List   Diagnosis     Cysts of both ovaries     Dysmenorrhea     Pain in joint involving pelvic region and thigh, unspecified laterality     AMA (advanced maternal age) multigravida 35+     Anemia     Chronic fatigue syndrome     Digestive-genital tract fistula, female     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     Iron deficiency anemia     Irritable bowel syndrome     Low ferritin     Malaise and fatigue     Myalgia     Paresthesia     S/P      Screening for diabetes mellitus     Seasonal allergies     Past Surgical History:   Procedure Laterality Date     APPENDECTOMY        SECTION      x2     COLONOSCOPY      Hx IBS type symptoms, workup for this     FISTULECTOMY RECTUM       LAPAROSCOPIC ABLATION ENDOMETRIOSIS N/A 2020    Procedure: ABLATION, ENDOMETRIUM, LAPAROSCOPIC;  Surgeon: Delmi West DO;  Location:  OR     LAPAROSCOPIC CYSTECTOMY OVARIAN (BENIGN) Bilateral 2020    Procedure: BILATERAL OVARIAN CYSTECTOMY; EXCISION OF LEFT PERITUBAL CYST; EXCISION AND FULGARATION OF ENDOMETRIOSIS; LYSIS OF ADHESION;  Surgeon: Delmi West DO;  Location:  OR     LAPAROSCOPIC LYSIS ADHESIONS N/A 2020    Procedure: LAPAROSCOPIC LYSIS OF ADHESION;  Surgeon: Delmi West DO;  Location:  OR      Social History     Tobacco Use     Smoking status: Never Smoker     Smokeless tobacco: Never Used   Substance Use Topics     Alcohol use: Yes     Comment: rarely      Problem (# of Occurrences) Relation (Name,Age of Onset)    Asthma (1) Maternal Grandfather    Hyperlipidemia (2) Mother, Father    Hypertension (2) Mother, Father    No Known Problems (5) Sister, Brother, Maternal Grandmother, Paternal Grandmother, Other    Prostate Cancer (1) Father            Current Outpatient Medications " "  Medication Sig     Fexofenadine HCl (ALLEGRA PO) Take 180 mg by mouth daily     Magnesium Oxide 140 MG CAPS Take 1 tablet by mouth daily      OCELLA 3-0.03 MG tablet TAKE 1 TABLET BY MOUTH DAILY. ONLY ACTIVE PILLS CONTINOUSLY AND SKIP PLACEBO     Ascorbic Acid (VITAMIN C) 500 MG CAPS Take 1 tablet by mouth daily  (Patient not taking: Reported on 1/14/2022)     Docosahexaenoic Acid (DHA) 200 MG capsule Take 200 mg by mouth daily (Patient not taking: Reported on 1/14/2022)     DULoxetine (CYMBALTA) 20 MG capsule  (Patient not taking: Reported on 1/14/2022)     Vitamin D, Cholecalciferol, 25 MCG (1000 UT) TABS Take 2 tablets by mouth daily  (Patient not taking: Reported on 1/14/2022)     Current Facility-Administered Medications   Medication     leuprolide (LUPRON DEPOT) kit 3.75 mg     Allergies   Allergen Reactions     Ciprofloxacin Hives       Past medical, surgical, social and family histories were reviewed and updated in EPIC.    ROS:   12 point review of systems negative other than symptoms noted below or in the HPI.  No urinary frequency or dysuria, bladder or kidney problems    EXAM:  /58 (BP Location: Right arm, Patient Position: Sitting, Cuff Size: Adult Regular)   Ht 1.676 m (5' 6\")   Wt 67.2 kg (148 lb 3.2 oz)   LMP 03/01/2020   Breastfeeding No   BMI 23.92 kg/m     BMI: Body mass index is 23.92 kg/m .    PHYSICAL EXAM:  Constitutional:   Appearance: Well nourished, well developed, alert, in no acute distress  Neck:  Lymph Nodes:  No lymphadenopathy present    Thyroid:  Gland size normal, nontender, no nodules or masses present  on palpation  Chest:  Respiratory Effort:  Breathing unlabored  Cardiovascular:    Heart: Auscultation:  Regular rate, normal rhythm, no murmurs present  Breasts: Inspection of Breasts:  No lymphadenopathy present., Palpation of Breasts and Axillae:  No masses present on palpation, no breast tenderness., Axillary Lymph Nodes:  No lymphadenopathy present. and No " nodularity, asymmetry or nipple discharge bilaterally.  Gastrointestinal:   Abdominal Examination:  Abdomen nontender to palpation, tone normal without rigidity or guarding, no masses present, umbilicus without lesions   Liver and Spleen:  No hepatomegaly present, liver nontender to palpation    Hernias:  No hernias present  Lymphatic: Lymph Nodes:  No other lymphadenopathy present  Skin:  General Inspection:  No rashes present, no lesions present, no areas of  discoloration  Neurologic:    Mental Status:  Oriented X3.  Normal strength and tone, sensory exam                grossly normal, mentation intact and speech normal.    Psychiatric:   Mentation appears normal and affect normal/bright.         Pelvic Exam:  External Genitalia:     Normal appearance for age, no discharge present, no tenderness present, no inflammatory lesions present, color normal  Vagina:     Normal vaginal vault without central or paravaginal defects, no discharge present, no inflammatory lesions present, no masses present  Bladder:     Nontender to palpation  Urethra:   Urethral Body:  Urethra palpation normal, urethra structural support normal   Urethral Meatus:  No erythema or lesions present  Cervix:     Appearance healthy, no lesions present, nontender to palpation, no bleeding present  Uterus:     Uterus: firm, normal sized and nontender, anteverted in position.   Adnexa:     No adnexal tenderness present, no adnexal masses present  Perineum:     Perineum within normal limits, no evidence of trauma, no rashes or skin lesions present  Anus:     Anus within normal limits, no hemorrhoids present  Inguinal Lymph Nodes:     No lymphadenopathy present  Pubic Hair:     Normal pubic hair distribution for age  Genitalia and Groin:     No rashes present, no lesions present, no areas of discoloration, no masses present      COUNSELING:   Reviewed preventive health counseling, as reflected in patient instructions       Osteoporosis prevention/bone  health    BMI: Body mass index is 23.92 kg/m .    ASSESSMENT:  46 year old female with satisfactory annual exam.    ICD-10-CM    1. Encounter for gynecological examination (general) (routine) without abnormal findings  Z01.419    2. Female pelvic pain  R10.2 OCELLA 3-0.03 MG tablet   3. Endometriosis  N80.9 OCELLA 3-0.03 MG tablet       PLAN:  -UTD for cervical cancer screening. Reviewed guidelines-pap q 3yrs until age 30 when co-testing q 5 years.  -Breast self awareness discussed. Due for mammogram.  -Colonoscopy due this year. Referral place  -Osteoporosis prevention discussed.  -Rec starting metamucil or citrucel twice daily. Then add miralax or colace if needed. Then if still having issues can us MOM episodically  -Refill OCPs. Is accepting of the level of management at this time, though still symtpomatic. If decides we can try going through authorization again for lupron.  -Return one year for next annual exam          Delmi Calderon Masters, DO

## 2022-01-14 ENCOUNTER — ANCILLARY PROCEDURE (OUTPATIENT)
Dept: MAMMOGRAPHY | Facility: CLINIC | Age: 47
End: 2022-01-14
Payer: COMMERCIAL

## 2022-01-14 ENCOUNTER — OFFICE VISIT (OUTPATIENT)
Dept: OBGYN | Facility: CLINIC | Age: 47
End: 2022-01-14
Payer: COMMERCIAL

## 2022-01-14 VITALS
HEIGHT: 66 IN | DIASTOLIC BLOOD PRESSURE: 58 MMHG | WEIGHT: 148.2 LBS | SYSTOLIC BLOOD PRESSURE: 114 MMHG | BODY MASS INDEX: 23.82 KG/M2

## 2022-01-14 DIAGNOSIS — Z12.31 VISIT FOR SCREENING MAMMOGRAM: ICD-10-CM

## 2022-01-14 DIAGNOSIS — N80.9 ENDOMETRIOSIS: ICD-10-CM

## 2022-01-14 DIAGNOSIS — R10.2 FEMALE PELVIC PAIN: ICD-10-CM

## 2022-01-14 DIAGNOSIS — Z01.419 ENCOUNTER FOR GYNECOLOGICAL EXAMINATION (GENERAL) (ROUTINE) WITHOUT ABNORMAL FINDINGS: Primary | ICD-10-CM

## 2022-01-14 DIAGNOSIS — Z12.11 SCREEN FOR COLON CANCER: ICD-10-CM

## 2022-01-14 PROCEDURE — 77067 SCR MAMMO BI INCL CAD: CPT | Mod: TC | Performed by: RADIOLOGY

## 2022-01-14 PROCEDURE — 99396 PREV VISIT EST AGE 40-64: CPT | Performed by: OBSTETRICS & GYNECOLOGY

## 2022-01-14 RX ORDER — DROSPIRENONE AND ETHINYL ESTRADIOL 0.03MG-3MG
KIT ORAL
Qty: 112 TABLET | Refills: 8 | Status: SHIPPED | OUTPATIENT
Start: 2022-01-14 | End: 2023-01-30

## 2022-01-14 ASSESSMENT — ANXIETY QUESTIONNAIRES
3. WORRYING TOO MUCH ABOUT DIFFERENT THINGS: SEVERAL DAYS
GAD7 TOTAL SCORE: 2
6. BECOMING EASILY ANNOYED OR IRRITABLE: SEVERAL DAYS
1. FEELING NERVOUS, ANXIOUS, OR ON EDGE: NOT AT ALL
4. TROUBLE RELAXING: NOT AT ALL
7. FEELING AFRAID AS IF SOMETHING AWFUL MIGHT HAPPEN: NOT AT ALL
5. BEING SO RESTLESS THAT IT IS HARD TO SIT STILL: NOT AT ALL
7. FEELING AFRAID AS IF SOMETHING AWFUL MIGHT HAPPEN: NOT AT ALL
GAD7 TOTAL SCORE: 2
2. NOT BEING ABLE TO STOP OR CONTROL WORRYING: NOT AT ALL
GAD7 TOTAL SCORE: 2

## 2022-01-14 ASSESSMENT — MIFFLIN-ST. JEOR: SCORE: 1338.98

## 2022-01-14 ASSESSMENT — PATIENT HEALTH QUESTIONNAIRE - PHQ9
SUM OF ALL RESPONSES TO PHQ QUESTIONS 1-9: 2
SUM OF ALL RESPONSES TO PHQ QUESTIONS 1-9: 2
10. IF YOU CHECKED OFF ANY PROBLEMS, HOW DIFFICULT HAVE THESE PROBLEMS MADE IT FOR YOU TO DO YOUR WORK, TAKE CARE OF THINGS AT HOME, OR GET ALONG WITH OTHER PEOPLE: NOT DIFFICULT AT ALL

## 2022-01-14 NOTE — PATIENT INSTRUCTIONS
-I placed a referral for colonoscopy as this is now recommended at age 45. They will call you to schedule this    -Daily total calcium intake (between food/supplements) should be 1000mg which equates to 3-4 servings calcium containing food per day; VItamin D 1000IU.   Foods rich in calcium are: milk, cheese, yogurt, seafood, sardines and canned salmon, leafy green vegetables such as jonathon greens, spinach and kale, beans and lentils, almonds, seeds (poppy, sesame, celery, flavio), rhubarb, dried fruit such as figs, whey protein, tofu and edamame, amaranth, other foods with added calcium such as orange juice and some cereals.   If adequate amount not taken in diet, then a supplement may be needed.     -I also recommend increasing your dietary fiber by starting Metamucil (powder mixed in glass of water) twice daily

## 2022-01-15 ASSESSMENT — ANXIETY QUESTIONNAIRES: GAD7 TOTAL SCORE: 2

## 2022-01-15 ASSESSMENT — PATIENT HEALTH QUESTIONNAIRE - PHQ9: SUM OF ALL RESPONSES TO PHQ QUESTIONS 1-9: 2

## 2022-01-29 DIAGNOSIS — R10.2 FEMALE PELVIC PAIN: ICD-10-CM

## 2022-01-29 DIAGNOSIS — N80.9 ENDOMETRIOSIS: ICD-10-CM

## 2022-01-31 RX ORDER — DROSPIRENONE AND ETHINYL ESTRADIOL 0.03MG-3MG
KIT ORAL
Qty: 28 TABLET | OUTPATIENT
Start: 2022-01-31

## 2022-01-31 NOTE — TELEPHONE ENCOUNTER
"Requested Prescriptions   Pending Prescriptions Disp Refills     OCELLA 3-0.03 MG tablet [Pharmacy Med Name: OCELLA TABLETS 28] 28 tablet      Sig: TAKE 1 ACTIVE TABLET DAILY       Contraceptives Protocol Passed - 1/29/2022  8:48 AM        Passed - Patient is not a current smoker if age is 35 or older        Passed - Recent (12 mo) or future (30 days) visit within the authorizing provider's specialty     Patient has had an office visit with the authorizing provider or a provider within the authorizing providers department within the previous 12 mos or has a future within next 30 days. See \"Patient Info\" tab in inbasket, or \"Choose Columns\" in Meds & Orders section of the refill encounter.              Passed - Medication is active on med list        Passed - No active pregnancy on record        Passed - No positive pregnancy test in past 12 months           Last Written Prescription Date:  01/14/22  Last Fill Quantity: 112,  # refills: 8   Last office visit: 1/14/2022 with prescribing provider:  Brett   Future Office Visit: none found    Refills available  Rachel Jacobsen RN on 1/31/2022 at 9:40 AM        "

## 2022-10-19 ENCOUNTER — IMMUNIZATION (OUTPATIENT)
Dept: FAMILY MEDICINE | Facility: CLINIC | Age: 47
End: 2022-10-19
Payer: COMMERCIAL

## 2022-10-19 DIAGNOSIS — Z23 NEED FOR PROPHYLACTIC VACCINATION AND INOCULATION AGAINST INFLUENZA: Primary | ICD-10-CM

## 2022-10-19 PROCEDURE — 90686 IIV4 VACC NO PRSV 0.5 ML IM: CPT

## 2022-10-19 PROCEDURE — 99207 PR NO CHARGE NURSE ONLY: CPT

## 2022-10-19 PROCEDURE — 90471 IMMUNIZATION ADMIN: CPT

## 2023-01-26 NOTE — PROGRESS NOTES
Melissa is a 47 year old  female who presents for annual exam.     Besides routine health maintenance, she has no other health concerns today .    HPI:  The patient's PCP is  Delmi West, DO.      Off OCPs since . Didn't like how she felt on them. Gained weight.   Ortho novum, then lo ovral before going on ocella. Has had some sort of SE on each of these others. Seems to tolerate them less with age.     Periods are very painful, along her c/s scar. Bad for 3 days. Last few months of ovulation have not been too bad.       GYNECOLOGIC HISTORY:    Patient's last menstrual period was 2023.    Regular menses? yes  Menses every 30 days.  Length of menses: 4 days    Her current contraception method is: condoms.  She  reports that she has never smoked. She has never used smokeless tobacco.    Patient is sexually active.  STD testing offered?  Declined  Last PHQ-9 score on record =   PHQ-9 SCORE 2023   PHQ-9 Total Score MyChart -   PHQ-9 Total Score 0     Last GAD7 score on record =   GABY-7 SCORE 2023   Total Score -   Total Score 1     Alcohol Score = 1    HEALTH MAINTENANCE:  Cholesterol:   Recent Labs   Lab Test 18  1127   CHOL 152   HDL 66   LDL 77   TRIG 46     Last Mammo: One year ago, Result: Normal, Next Mammo: Today   Pap: 2018 HPV-  Lab Results   Component Value Date    PAP NIL 2018      Colonoscopy:  never, Result: Not applicable, Next Colonoscopy: due at age 45-50 years.  Dexa:  never    Health maintenance updated:  yes    HISTORY:  OB History    Para Term  AB Living   3 2 2 0 1 2   SAB IAB Ectopic Multiple Live Births   1 0 0 0 2      # Outcome Date GA Lbr Neo/2nd Weight Sex Delivery Anes PTL Lv   3 Term 14 39w1d  3.615 kg (7 lb 15.5 oz) F CS-Unspec Spinal N ARAM      Name: STEWARTBABY GIRL      Apgar1: 9  Apgar5: 9   2 Term 12 38w6d  3.065 kg (6 lb 12.1 oz) M CS-Unspec  N ARAM      Birth Comments: C/S for hx of fistulas       "Name: Yves Simmons\"\"\"\"      Apgar1: 8  Apgar5: 9   1 2010               Patient Active Problem List   Diagnosis     Cysts of both ovaries     Dysmenorrhea     Pain in joint involving pelvic region and thigh, unspecified laterality     AMA (advanced maternal age) multigravida 35+     Anemia     Chronic fatigue syndrome     Digestive-genital tract fistula, female     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     Iron deficiency anemia     Irritable bowel syndrome     Low ferritin     Malaise and fatigue     Myalgia     Paresthesia     S/P      Screening for diabetes mellitus     Seasonal allergies     Past Surgical History:   Procedure Laterality Date     APPENDECTOMY        SECTION      x2     COLONOSCOPY  2009    Hx IBS type symptoms, workup for this     FISTULECTOMY RECTUM       LAPAROSCOPIC ABLATION ENDOMETRIOSIS N/A 2020    Procedure: ABLATION, ENDOMETRIUM, LAPAROSCOPIC;  Surgeon: Delmi West DO;  Location:  OR     LAPAROSCOPIC CYSTECTOMY OVARIAN (BENIGN) Bilateral 2020    Procedure: BILATERAL OVARIAN CYSTECTOMY; EXCISION OF LEFT PERITUBAL CYST; EXCISION AND FULGARATION OF ENDOMETRIOSIS; LYSIS OF ADHESION;  Surgeon: Delmi West DO;  Location:  OR     LAPAROSCOPIC LYSIS ADHESIONS N/A 2020    Procedure: LAPAROSCOPIC LYSIS OF ADHESION;  Surgeon: Delmi West DO;  Location:  OR      Social History     Tobacco Use     Smoking status: Never     Smokeless tobacco: Never   Substance Use Topics     Alcohol use: Yes     Comment: rarely      Problem (# of Occurrences) Relation (Name,Age of Onset)    Asthma (1) Maternal Grandfather    Hypertension (2) Mother, Father    Prostate Cancer (1) Father    Hyperlipidemia (2) Mother, Father    No Known Problems (5) Sister, Brother, Maternal Grandmother, Paternal Grandmother, Other            Current Outpatient Medications   Medication Sig     EPINEPHrine (ANY BX GENERIC EQUIV) 0.3 MG/0.3ML injection 2-pack " "INJECT AS NEEDED FOR SEVERE ALLERGIC REACTION INCLUDING ANAPHYLAXIS AS DIRECTED     Docosahexaenoic Acid (DHA) 200 MG capsule Take 200 mg by mouth daily (Patient not taking: Reported on 1/14/2022)     Fexofenadine HCl (ALLEGRA PO) Take 180 mg by mouth daily     ISIBLOOM 0.15-30 MG-MCG tablet TAKE 1 ACTIVE TABLET BY MOUTH ONCE DAILY AS DIRECTED     Magnesium Oxide 140 MG CAPS Take 1 tablet by mouth daily      Vitamin D, Cholecalciferol, 25 MCG (1000 UT) TABS Take 2 tablets by mouth daily  (Patient not taking: Reported on 1/14/2022)     Current Facility-Administered Medications   Medication     leuprolide (LUPRON DEPOT) kit 3.75 mg     Allergies   Allergen Reactions     Ciprofloxacin Hives       Past medical, surgical, social and family histories were reviewed and updated in EPIC.    ROS:   12 point review of systems negative other than symptoms noted below or in the HPI.  No urinary frequency or dysuria, bladder or kidney problems    EXAM:  /66   Pulse 68   Ht 1.676 m (5' 6\")   Wt 63.5 kg (140 lb)   LMP 01/26/2023   BMI 22.60 kg/m     BMI: Body mass index is 22.6 kg/m .    PHYSICAL EXAM:  Constitutional:   Appearance: Well nourished, well developed, alert, in no acute distress  Neck:  Lymph Nodes:  No lymphadenopathy present    Thyroid:  Gland size normal, nontender, no nodules or masses present  on palpation  Chest:  Respiratory Effort:  Breathing unlabored  Cardiovascular:    Heart: Auscultation:  Regular rate, normal rhythm, no murmurs present  Breasts: Inspection of Breasts:  No lymphadenopathy present., Palpation of Breasts and Axillae:  No masses present on palpation, no breast tenderness., Axillary Lymph Nodes:  No lymphadenopathy present. and No nodularity, asymmetry or nipple discharge bilaterally.  Gastrointestinal:   Abdominal Examination:  Abdomen nontender to palpation, tone normal without rigidity or guarding, no masses present, umbilicus without lesions   Liver and Spleen:  No hepatomegaly " present, liver nontender to palpation    Hernias:  No hernias present  Lymphatic: Lymph Nodes:  No other lymphadenopathy present  Skin:  General Inspection:  No rashes present, no lesions present, no areas of  discoloration  Neurologic:    Mental Status:  Oriented X3.  Normal strength and tone, sensory exam                grossly normal, mentation intact and speech normal.    Psychiatric:   Mentation appears normal and affect normal/bright.         Pelvic Exam:  External Genitalia:     Normal appearance for age, no discharge present, no tenderness present, no inflammatory lesions present, color normal  Vagina:     Normal vaginal vault without central or paravaginal defects, no discharge present, no inflammatory lesions present, no masses present  Bladder:     Nontender to palpation  Urethra:   Urethral Body:  Urethra palpation normal, urethra structural support normal   Urethral Meatus:  No erythema or lesions present  Cervix:     Appearance healthy, no lesions present, nontender to palpation, no bleeding present  Uterus:     Uterus: firm, normal sized and nontender, midplane in position.   Adnexa:     No adnexal tenderness present, no adnexal masses present  Perineum:     Perineum within normal limits, no evidence of trauma, no rashes or skin lesions present  Anus:     Anus within normal limits, no hemorrhoids present  Inguinal Lymph Nodes:     No lymphadenopathy present  Pubic Hair:     Normal pubic hair distribution for age  Genitalia and Groin:     No rashes present, no lesions present, no areas of discoloration, no masses present      COUNSELING:   Reviewed preventive health counseling, as reflected in patient instructions       Osteoporosis prevention/bone health        ASSESSMENT:  47 year old female with satisfactory annual exam.    ICD-10-CM    1. Encounter for gynecological examination without abnormal finding  Z01.419       2. Endometriosis  N80.9 DISCONTINUED: desogestrel-ethinyl estradiol (APRI) 0.15-30  MG-MCG tablet      3. Screen for colon cancer  Z12.11 Colonoscopy Screening  Referral      4. Dysmenorrhea  N94.6           PLAN:  -UTD for cervical cancer screening.   -Breast self awareness discussed. Due for mammogram.  -Colonoscopy due  -Osteoporosis prevention discussed.  -Endometriosis.  Self discontinued continuous OCPs about a year ago.  Has had return of dysmenorrhea.  Reviewed the chronicity of endometriosis and suggest managing this until she goes through menopause to avoid further complications and pain associated with untreated endometriosis.  Reviewed treatment options available.  Is interested in trying another birth control pill, prescription given for Apri.  I expressed to her that I am not hopeful this will be too much different than the other pills but is easy to stop and start.  She is considering going to a IUD next, would be the Mirena IUD.  Reviewed Lupron injections as an option as well.  Questions answered.  -Return one year for next annual exam    (10 additional minutes were spent on the date of the encounter doing chart review, review of outside records, review and interpretation of pertinent test results, history and exam, documentation, patient counseling, and further activities as noted above as well as the typical preventative women's health exam.         Delmi Calderon Masters, DO

## 2023-01-30 ENCOUNTER — TELEPHONE (OUTPATIENT)
Dept: GASTROENTEROLOGY | Facility: CLINIC | Age: 48
End: 2023-01-30

## 2023-01-30 ENCOUNTER — ANCILLARY PROCEDURE (OUTPATIENT)
Dept: MAMMOGRAPHY | Facility: CLINIC | Age: 48
End: 2023-01-30
Payer: COMMERCIAL

## 2023-01-30 ENCOUNTER — OFFICE VISIT (OUTPATIENT)
Dept: OBGYN | Facility: CLINIC | Age: 48
End: 2023-01-30
Payer: COMMERCIAL

## 2023-01-30 VITALS
HEIGHT: 66 IN | SYSTOLIC BLOOD PRESSURE: 114 MMHG | HEART RATE: 68 BPM | WEIGHT: 140 LBS | BODY MASS INDEX: 22.5 KG/M2 | DIASTOLIC BLOOD PRESSURE: 66 MMHG

## 2023-01-30 DIAGNOSIS — Z01.419 ENCOUNTER FOR GYNECOLOGICAL EXAMINATION WITHOUT ABNORMAL FINDING: Primary | ICD-10-CM

## 2023-01-30 DIAGNOSIS — Z12.31 VISIT FOR SCREENING MAMMOGRAM: ICD-10-CM

## 2023-01-30 DIAGNOSIS — N80.9 ENDOMETRIOSIS: ICD-10-CM

## 2023-01-30 DIAGNOSIS — Z12.11 SCREEN FOR COLON CANCER: ICD-10-CM

## 2023-01-30 DIAGNOSIS — N94.6 DYSMENORRHEA: ICD-10-CM

## 2023-01-30 PROCEDURE — 99214 OFFICE O/P EST MOD 30 MIN: CPT | Mod: 25 | Performed by: OBSTETRICS & GYNECOLOGY

## 2023-01-30 PROCEDURE — 99396 PREV VISIT EST AGE 40-64: CPT | Performed by: OBSTETRICS & GYNECOLOGY

## 2023-01-30 PROCEDURE — 77067 SCR MAMMO BI INCL CAD: CPT | Mod: TC | Performed by: RADIOLOGY

## 2023-01-30 RX ORDER — DESOGESTREL AND ETHINYL ESTRADIOL 0.15-0.03
KIT ORAL
Qty: 112 TABLET | Refills: 3 | Status: SHIPPED | OUTPATIENT
Start: 2023-01-30 | End: 2023-12-13

## 2023-01-30 RX ORDER — EPINEPHRINE 0.3 MG/.3ML
INJECTION SUBCUTANEOUS
COMMUNITY
Start: 2022-11-04

## 2023-01-30 RX ORDER — DESOGESTREL AND ETHINYL ESTRADIOL 0.15-0.03
1 KIT ORAL DAILY
Qty: 84 TABLET | Refills: 8 | Status: SHIPPED | OUTPATIENT
Start: 2023-01-30 | End: 2023-01-30

## 2023-01-30 ASSESSMENT — ANXIETY QUESTIONNAIRES
IF YOU CHECKED OFF ANY PROBLEMS ON THIS QUESTIONNAIRE, HOW DIFFICULT HAVE THESE PROBLEMS MADE IT FOR YOU TO DO YOUR WORK, TAKE CARE OF THINGS AT HOME, OR GET ALONG WITH OTHER PEOPLE: NOT DIFFICULT AT ALL
7. FEELING AFRAID AS IF SOMETHING AWFUL MIGHT HAPPEN: NOT AT ALL
6. BECOMING EASILY ANNOYED OR IRRITABLE: NOT AT ALL
5. BEING SO RESTLESS THAT IT IS HARD TO SIT STILL: NOT AT ALL
GAD7 TOTAL SCORE: 1
3. WORRYING TOO MUCH ABOUT DIFFERENT THINGS: NOT AT ALL
GAD7 TOTAL SCORE: 1
1. FEELING NERVOUS, ANXIOUS, OR ON EDGE: SEVERAL DAYS
2. NOT BEING ABLE TO STOP OR CONTROL WORRYING: NOT AT ALL

## 2023-01-30 ASSESSMENT — PATIENT HEALTH QUESTIONNAIRE - PHQ9
SUM OF ALL RESPONSES TO PHQ QUESTIONS 1-9: 0
5. POOR APPETITE OR OVEREATING: NOT AT ALL

## 2023-01-30 NOTE — PATIENT INSTRUCTIONS
-Daily total calcium intake (between food/supplements) should be 1000mg which equates to 3-4 servings calcium containing food per day; VItamin D 1000IU.   Foods rich in calcium are: milk, cheese, yogurt, seafood, sardines and canned salmon, leafy green vegetables such as jonathon greens, spinach and kale, beans and lentils, almonds, seeds (poppy, sesame, celery, flavio), rhubarb, dried fruit such as figs, whey protein, tofu and edamame, amaranth, other foods with added calcium such as orange juice and some cereals.   If adequate amount not taken in diet, then a supplement may be needed.     -I also recommend increasing your dietary fiber by starting Metamucil (powder mixed in glass of water) once to twice daily

## 2023-01-30 NOTE — TELEPHONE ENCOUNTER
"Requested Prescriptions   Pending Prescriptions Disp Refills     ISIBLOOM 0.15-30 MG-MCG tablet [Pharmacy Med Name: ISIBLOOM 0.15MG-0.03MG TABLETS 28S] 112 tablet      Sig: TAKE 1 ACTIVE TABLET BY MOUTH ONCE DAILY AS DIRECTED       Contraceptives Protocol Passed - 1/30/2023  4:33 PM        Passed - Patient is not a current smoker if age is 35 or older        Passed - Recent (12 mo) or future (30 days) visit within the authorizing provider's specialty     Patient has had an office visit with the authorizing provider or a provider within the authorizing providers department within the previous 12 mos or has a future within next 30 days. See \"Patient Info\" tab in inbasket, or \"Choose Columns\" in Meds & Orders section of the refill encounter.              Passed - Medication is active on med list        Passed - No active pregnancy on record        Passed - No positive pregnancy test in past 12 months           Prescription approved per [facility] Refill Protocol.  Rachel Jacobsen RN on 1/30/2023 at 4:34 PM    "

## 2023-01-30 NOTE — TELEPHONE ENCOUNTER
Screening Questions  BLUE  KIND OF PREP RED  LOCATION [review exclusion criteria] GREEN  SEDATION TYPE    Y  Are you active on mychart?   s, Delmi Calderon, DO  Ordering/Referring Provider?    MEDICA  What type of coverage do you have?  N  Have you had a positive covid test in the last 14 days?    22.3  1. BMI  [BMI 40+ - review exclusion criteria - [facility] + [facility]]            *NEED PAC APPT AT [facility] + [facility] (ONLY)*     SELF   2. Are you able to give consent for your medical care? [IF NO,RN REVIEW]          N  3. Are you taking any prescription pain medications on a routine schedule   (ex narcotics: tramadol, oxycodone, roxicodone, oxycontin,  and percocet)?               N  3a. EXTENDED PREP What kind of prescription?     N 4. Do you have any chemical dependencies such as alcohol, street drugs, or methadone?    **If yes 3- 5 , please schedule with MAC sedation.**          IF YES TO ANY 6 - 10 - HOSPITAL SETTING ONLY.     N 6.   Do you need assistance transferring?     N 7.   Have you had a heart or lung transplant?    N 8.   Are you currently on dialysis?   N 9.   Do you use daily home oxygen?   N 10. Do you take nitroglycerin?   10a. N If yes, how often?     11. [FEMALES]   Are you currently pregnant?     11a.  If yes, how many weeks? [ Greater than 12 weeks, OR NEEDED]    N 12. [review exclusion criteria]  Do you have any implantable devices in your body (pacemaker, defib, LVAD)?            *NEED PAC APPT AT [facility]*     N 13. Do you have Pulmonary Hypertension?             *NEED PAC APPT AT [facility]*     N 14. In the past 6 months, have you had any heart related issues including cardiomyopathy or heart attack?     N 14a. If yes, did it require cardiac stenting if so when?     N 15. Have you had a stroke or Transient ischemic attack (TIA - aka  mini stroke ) within 6 months?      N 16. Do you have mod to severe Obstructive Sleep Apnea?  [Hospital only - Ok at [facility]]    N 17. Do you have SEVERE AND UNCONTROLLED asthma?  "             *NEED PAC APPT AT [facility]*     N 18. Are you currently taking any blood thinners?     18a. If yes, inform patient to \"follow up w/ ordering provider for bridging instructions.\"    N 19. Do you take the medication Phentermine?    19a. If yes, \"Hold for 7 days before procedure.  Please consult your prescribing provider if you have questions about holding this medication.\"     N  20. Do you have chronic kidney disease?      N  21. Do you have a diagnosis of diabetes?     N  22. On a regular basis do you go 3-5 days between bowel movements?     23. Preferred LOCAL Pharmacy for Pre Prescription    [ LIST ONLY ONE PHARMACY]     [facility] #[id number] - [address] AT [facility] OF  [address]        - CLOSING REMINDERS -    Informed patient they will need an adult          Cannot take any type of public or medical transportation alone    Conscious Sedation- Needs  for 6 hours after the procedure        MAC/General-Needs  for 24 hours after procedure    Pre-Procedure Covid test to be completed         [Happy PCR/HOME Testing Required] + ADULT to ACCOMPANY     Confirmed Nurse will call to complete assessment       - SCHEDULING DETAILS -      Hospital Setting Required? If yes, what is the exclusion?:  N      Additional comments:  N      ERNIE   Surgeon   03/16/2023  Date of Procedure  MODERATE  Sedation Type     N PAC / Pre-op Required   Location  [facility]       Type of Procedure Scheduled  Lower Endoscopy [Colonoscopy]      Which Colonoscopy Prep was Sent?     STANDARD GOLYTELY-If you answer yes to questions #8, #20, #21          "

## 2023-02-03 ENCOUNTER — NURSE TRIAGE (OUTPATIENT)
Dept: FAMILY MEDICINE | Facility: CLINIC | Age: 48
End: 2023-02-03

## 2023-02-03 ENCOUNTER — VIRTUAL VISIT (OUTPATIENT)
Dept: INTERNAL MEDICINE | Facility: CLINIC | Age: 48
End: 2023-02-03
Payer: COMMERCIAL

## 2023-02-03 DIAGNOSIS — H10.33 ACUTE CONJUNCTIVITIS OF BOTH EYES, UNSPECIFIED ACUTE CONJUNCTIVITIS TYPE: Primary | ICD-10-CM

## 2023-02-03 PROCEDURE — 99213 OFFICE O/P EST LOW 20 MIN: CPT | Mod: 95 | Performed by: PHYSICIAN ASSISTANT

## 2023-02-03 RX ORDER — GENTAMICIN SULFATE 3 MG/ML
1-2 SOLUTION/ DROPS OPHTHALMIC 4 TIMES DAILY
Qty: 3 ML | Refills: 0 | Status: SHIPPED | OUTPATIENT
Start: 2023-02-03 | End: 2023-02-10

## 2023-02-03 ASSESSMENT — ENCOUNTER SYMPTOMS: EYE PAIN: 1

## 2023-02-03 NOTE — PROGRESS NOTES
Melissa is a 47 year old who is being evaluated via a billable video visit.      How would you like to obtain your AVS? MyChart  If the video visit is dropped, the invitation should be resent by: Send to e-mail at: [email or web address]  Will anyone else be joining your video visit? No          Assessment & Plan     Acute conjunctivitis of both eyes, unspecified acute conjunctivitis type    - gentamicin (GARAMYCIN) 0.3 % ophthalmic solution; Place 1-2 drops into both eyes 4 times daily for 7 days             Warm compresses  Good handwashing  abx drop     Return in about 1 week (around 2/10/2023) for recheck if not improving eye care provider.    Marcela Cleary PA-C  [facility]   Melissa is a 47 year old, presenting for the following health issues:  Eye Problem      Eye Problem     History of Present Illness       Reason for visit:  Pink eye  Symptom onset:  Today  Symptoms include:  Left eye is red inside and outside, yellow goop, burning , itching  Symptom intensity:  Severe  Symptom progression:  Worsening  Had these symptoms before:  Yes  Has tried/received treatment for these symptoms:  Yes  Previous treatment was successful:  Yes  Prior treatment description:  Antibiotic drops    She eats 2-3 servings of fruits and vegetables daily.She consumes 0 sweetened beverage(s) daily.She exercises with enough effort to increase her heart rate 10 to 19 minutes per day.  She exercises with enough effort to increase her heart rate 3 or less days per week.   She is taking medications regularly.             Review of Systems   Eyes: Positive for pain.            Objective           Vitals:  No vitals were obtained today due to virtual visit.    Physical Exam   GENERAL: Healthy, alert and no distress  EYES: PERRL, EOMI, eyelids- swelling left eyelid noted  and conjunctiva/corneas- conjunctival injection OS and discharge present yes per patient   RESP: No audible  wheeze, cough, or visible cyanosis.  No visible retractions or increased work of breathing.    SKIN: Visible skin clear. No significant rash, abnormal pigmentation or lesions.  NEURO: Cranial nerves grossly intact.  Mentation and speech appropriate for age.  PSYCH: Mentation appears normal, affect normal/bright, judgement and insight intact, normal speech and appearance well-groomed.                Video-Visit Details    Type of service:  Video Visit     Originating Location (pt. Location): Home    Distant Location (provider location):  On-site  Platform used for Video Visit: [employer]

## 2023-02-03 NOTE — TELEPHONE ENCOUNTER
Patient called     Complains of left eye pink eye     Woke up and eye was matted shut     This AM started     EYE DISCHARGE: started clear, now is yellow and getting heavier   REDNESS: left eye is all red, right eye is starting to look a little red   EYELIDS: left eye is red all the way around and top is swollen   PAIN: itching and burning sensation   CONTACT LENS: no   FEVER/RUNNY NOSE/COUGH? Cough 2 days ago, yesterday laryngitis, voice starting to come back, no fever, no runny nose     Detailed message okay if needed    Scheduled VV today     Appointments in Next Year    Feb 03, 2023  2:30 PM  (Arrive by 2:10 PM)  Provider Visit with Marcela Cleary PA-C  [facility] ([facility] ) [phone]   Mar 30, 2023  3:00 PM  Office Visit with Delmi West,   [facility] ([facility] ) [phone]        Tanya Mccall RN on 2/3/2023 at 1:27 PM      Reason for Disposition    Eye with yellow or green discharge or eyelashes stick together, but NO standing order to call in antibiotic eye drops  (Exception: Luana; continue triage.)    Additional Information    Negative: Eye exposure to chemical or fumes    Negative: Redness of white of eye (sclera), but no pus or only a small amount of brief pus    Negative: SEVERE pain (e.g., excruciating)    Negative: Patient sounds very sick or weak to the triager    Negative: MODERATE eye pain (e.g., interferes with normal activities)    Negative: Looking at light causes MODERATE to SEVERE eye pain (i.e., photophobia)    Negative: Blurred vision    Negative: Cloudy spot or sore seen on the cornea (clear part of the eye)    Negative: Eyelids are very swollen (shut or almost)    Negative: Eyelid (outer) is very red and painful (or tender to touch)    Negative: Fever > 103 F (39.4 C)    Negative: MILD eye pain or discomfort and wears contacts     Negative: Discharge from penis    Negative: New or abnormal vaginal discharge    Negative: Using antibiotic eyedrops > 3 days but pus persists    Negative: Lots of yellow or green nasal discharge    Negative: Weak immune system (e.g., HIV positive, cancer chemo, splenectomy, organ transplant, chronic steroids)    Negative: Patient wants to be seen    Negative: Fever present > 3 days (72 hours)    Negative: Bleeding on white of the eye    Protocols used: EYE - PUS OR [id number]

## 2023-03-08 RX ORDER — BISACODYL 5 MG/1
TABLET, DELAYED RELEASE ORAL
Qty: 4 TABLET | Refills: 0 | Status: SHIPPED | OUTPATIENT
Start: 2023-03-08

## 2023-03-14 ENCOUNTER — TELEPHONE (OUTPATIENT)
Dept: OBGYN | Facility: CLINIC | Age: 48
End: 2023-03-14
Payer: COMMERCIAL

## 2023-03-14 NOTE — TELEPHONE ENCOUNTER
Patient states she has been on this birther control now since 1/30/23  ISIBLOOM 0.15-30 MG-MCG tablet    States she takes this continuously due to endometriosis. Has been having break through bleeding this last week. Has been wearing a tampon the last few days, it started out as spotting though.    States in the past when this happened her doctor had her double up on the pills but want to make sure she was doing everything correctly.    States she does really like this birth control but better then the other medications she had been on previously    Educated patient that it can take up to 90 days of good pill taking for her body to get fully adjust to the new medication. Patient states she has not missed any doses and does take the medication as close to the same time each day.    Routing to provider to advise.  Yulissa Alvarez RN on 3/14/2023 at 9:42 AM

## 2023-03-14 NOTE — TELEPHONE ENCOUNTER
Agree with guidance provided by RN.  If she were to have breakthrough bleeding the first 1 to 3 packs are the highest risk time for this.  It is not dangerous, does not affect efficacy, but can be bothersome.  Options:  1) she is not overly bothered she can just keep taking the pills once daily and with time spotting should hopefully resolve.  2) if pretty bothered by it she can increase her pills to 3 pills a day for 3 days, then 2 pills a day for 2 days,  Then go back to 1 pill a day of active pills only.  As she is doubling tripling up on pills, this could increase the risk of side effects of the estrogen/progesterone medications, including nausea, breast tenderness, or risk of blood clots in.  Overall ,however, the risk blood clotting is low.  If she chooses this option we likely will have to provide her with 1 extra pill pack.    Delmi Calderon Masters, DO

## 2023-03-14 NOTE — TELEPHONE ENCOUNTER
"Pt advised w below recommendations. Pt is going to \"tough it out some more\" for now BUT is aware of option#2 below and may try that as well in the future if no resolve.She will call back if needs an extra pack of pills.    Pt verbalized understanding, in agreement with plan, and voiced no further questions.  Maren Naqvi RN on 3/14/2023 at 3:55 PM    "

## 2023-03-16 ENCOUNTER — HOSPITAL ENCOUNTER (OUTPATIENT)
Facility: CLINIC | Age: 48
Discharge: HOME | End: 2023-03-16
Attending: COLON & RECTAL SURGERY | Admitting: COLON & RECTAL SURGERY
Payer: COMMERCIAL

## 2023-03-16 VITALS
DIASTOLIC BLOOD PRESSURE: 74 MMHG | SYSTOLIC BLOOD PRESSURE: 112 MMHG | WEIGHT: 140 LBS | RESPIRATION RATE: 24 BRPM | HEART RATE: 65 BPM | OXYGEN SATURATION: 99 % | HEIGHT: 66 IN | BODY MASS INDEX: 22.5 KG/M2

## 2023-03-16 DIAGNOSIS — Z12.11 ENCOUNTER FOR SCREENING COLONOSCOPY: Primary | ICD-10-CM

## 2023-03-16 LAB — COLONOSCOPY: NORMAL

## 2023-03-16 PROCEDURE — 88305 TISSUE EXAM BY PATHOLOGIST: CPT | Mod: TC | Performed by: COLON & RECTAL SURGERY

## 2023-03-16 PROCEDURE — 88305 TISSUE EXAM BY PATHOLOGIST: CPT | Mod: 26 | Performed by: PATHOLOGY

## 2023-03-16 PROCEDURE — 250N000011 HC RX IP 250 OP 636: Performed by: COLON & RECTAL SURGERY

## 2023-03-16 PROCEDURE — 45385 COLONOSCOPY W/LESION REMOVAL: CPT | Mod: PT | Performed by: COLON & RECTAL SURGERY

## 2023-03-16 PROCEDURE — G0500 MOD SEDAT ENDO SERVICE >5YRS: HCPCS | Performed by: COLON & RECTAL SURGERY

## 2023-03-16 RX ORDER — ONDANSETRON 2 MG/ML
4 INJECTION INTRAMUSCULAR; INTRAVENOUS
Status: CANCELLED | OUTPATIENT
Start: 2023-03-16

## 2023-03-16 RX ORDER — LIDOCAINE 40 MG/G
CREAM TOPICAL
Status: CANCELLED | OUTPATIENT
Start: 2023-03-16

## 2023-03-16 RX ORDER — FENTANYL CITRATE 50 UG/ML
INJECTION, SOLUTION INTRAMUSCULAR; INTRAVENOUS PRN
Status: DISCONTINUED | OUTPATIENT
Start: 2023-03-16 | End: 2023-03-16 | Stop reason: HOSPADM

## 2023-03-16 ASSESSMENT — ACTIVITIES OF DAILY LIVING (ADL): ADLS_ACUITY_SCORE: 35

## 2023-03-16 NOTE — H&P
Colon & Rectal Surgery History and Physical  Pre-Endoscopy Procedure Note    History of Present Illness   I have been asked by Dr. West to evaluate this 47 year old female for colorectal cancer screening. She currently denies any abdominal pain, weight loss, bleeding per rectum, or recent change in bowel habits.    Past Medical History  Diagnosis Date     Depression     Off meds since early .      Endometriosis      Fibromyalgia      Fistula      Motion sickness      PONV (postoperative nausea and vomiting)        Past Surgical History  Procedure Laterality Date     APPENDECTOMY        SECTION      x2     FISTULECTOMY RECTUM       LAPAROSCOPIC ABLATION ENDOMETRIOSIS N/A 2020    Procedure: ABLATION, ENDOMETRIUM, LAPAROSCOPIC;  Surgeon: Delmi West DO;  Location:  OR     LAPAROSCOPIC CYSTECTOMY OVARIAN (BENIGN) Bilateral 2020    Procedure: BILATERAL OVARIAN CYSTECTOMY; EXCISION OF LEFT PERITUBAL CYST; EXCISION AND FULGARATION OF ENDOMETRIOSIS; LYSIS OF ADHESION;  Surgeon: Delmi West DO;  Location:  OR     LAPAROSCOPIC LYSIS ADHESIONS N/A 2020    Procedure: LAPAROSCOPIC LYSIS OF ADHESION;  Surgeon: Delmi West DO;  Location:  OR        Medications  Medication Sig     Fexofenadine HCl (ALLEGRA PO) Take 180 mg by mouth daily     ISIBLOOM 0.15-30 MG-MCG tablet TAKE 1 ACTIVE TABLET BY MOUTH ONCE DAILY AS DIRECTED     EPINEPHrine (ANY BX GENERIC EQUIV) 0.3 MG/0.3ML injection 2-pack INJECT AS NEEDED FOR SEVERE ALLERGIC REACTION INCLUDING ANAPHYLAXIS AS DIRECTED     Magnesium Oxide 140 MG CAPS Take 1 tablet by mouth daily        Allergies  Allergen Reactions     Ciprofloxacin Hives        Family History   Family history includes Asthma in her maternal grandfather; Hyperlipidemia in her father and mother; Hypertension in her father and mother; Prostate Cancer in her father.     Social History   She reports that she has never smoked. She has never  "used smokeless tobacco. She reports current alcohol use. She reports that she does not use drugs.    Review of Systems   Constitutional:  No fever, weight change or fatigue.    Eyes:     No dry eyes or vision changes.   Ears/Nose/Throat/Neck:  No oral ulcers, sore throat or voice change.    Cardiovascular:   No palpitations, syncope, angina or edema.   Respiratory:    No chest pain, excessive sleepiness, shortness of breath or hemoptysis.    Gastrointestinal:   No abdominal pain, nausea, vomiting, diarrhea or heartburn.    Genitourinary:   No dysuria, hematuria, urinary retention or urinary frequency.   Musculoskeletal:  No joint swelling or arthralgias.    Dermatologic:  No skin rash or other skin changes.   Neurologic:    No focal weakness or numbness. No neuropathy.   Psychiatric:    No depression, anxiety, suicidal ideation, or paranoid ideation.   Endocrine:   No cold or heat intolerance, polydipsia, hirsutism, change in libido, or flushing.   Hematology/Lymphatic:  No bleeding or lymphadenopathy.    Allergy/Immunology:  No rhinitis or hives.     Physical Exam   Vitals:  /69, HR 84, RR 16, height 1.676 m (5' 6\"), weight 63.5 kg (140 lb), last menstrual period 01/26/2023, SpO2 99 %, not currently breastfeeding.    General:  Alert and oriented to person, place and time   Airway: Normal oropharyngeal airway and neck mobility   Lungs:  Clear bilaterally   Heart:  Regular rate and rhythm   Abdomen: Soft, NT, ND, no masses   Extremities: Warm, good capillary refill    ASA Grade: II (mild systemic disease)    Impression: Cleared for use of conscious sedation for colorectal cancer screening    Plan: Proceed with colonoscopy     Dania Babin MD  [facility]  [phone]  "

## 2023-03-17 LAB
PATH REPORT.COMMENTS IMP SPEC: NORMAL
PATH REPORT.COMMENTS IMP SPEC: NORMAL
PATH REPORT.FINAL DX SPEC: NORMAL
PATH REPORT.GROSS SPEC: NORMAL
PATH REPORT.MICROSCOPIC SPEC OTHER STN: NORMAL
PATH REPORT.RELEVANT HX SPEC: NORMAL
PHOTO IMAGE: NORMAL

## 2023-10-27 ENCOUNTER — MYC MEDICAL ADVICE (OUTPATIENT)
Dept: FAMILY MEDICINE | Facility: CLINIC | Age: 48
End: 2023-10-27

## 2023-12-13 ENCOUNTER — MYC REFILL (OUTPATIENT)
Dept: OBGYN | Facility: CLINIC | Age: 48
End: 2023-12-13
Payer: COMMERCIAL

## 2023-12-13 DIAGNOSIS — N80.9 ENDOMETRIOSIS: ICD-10-CM

## 2023-12-13 RX ORDER — DESOGESTREL AND ETHINYL ESTRADIOL 0.15-0.03
1 KIT ORAL DAILY
Qty: 112 TABLET | Refills: 0 | Status: SHIPPED | OUTPATIENT
Start: 2023-12-13 | End: 2023-12-14

## 2023-12-13 NOTE — TELEPHONE ENCOUNTER
"Requested Prescriptions   Pending Prescriptions Disp Refills    desogestrel-ethinyl estradiol (ISIBLOOM) 0.15-30 MG-MCG tablet 112 tablet 3       Contraceptives Protocol Passed - 12/13/2023 11:15 AM        Passed - Patient is not a current smoker if age is 35 or older        Passed - Recent (12 mo) or future (30 days) visit within the authorizing provider's specialty     Patient has had an office visit with the authorizing provider or a provider within the authorizing providers department within the previous 12 mos or has a future within next 30 days. See \"Patient Info\" tab in inbasket, or \"Choose Columns\" in Meds & Orders section of the refill encounter.              Passed - Medication is active on med list        Passed - No active pregnancy on record        Passed - No positive pregnancy test in past 12 months           Prescription approved per [facility] Refill Protocol.  Appt scheduled for 2/1/24  Rachel Jacobsen RN on 12/13/2023 at 2:56 PM    "

## 2023-12-14 ENCOUNTER — MYC REFILL (OUTPATIENT)
Dept: OBGYN | Facility: CLINIC | Age: 48
End: 2023-12-14
Payer: COMMERCIAL

## 2023-12-14 DIAGNOSIS — N80.9 ENDOMETRIOSIS: ICD-10-CM

## 2023-12-14 RX ORDER — DESOGESTREL AND ETHINYL ESTRADIOL 0.15-0.03
1 KIT ORAL DAILY
Qty: 112 TABLET | Refills: 0 | OUTPATIENT
Start: 2023-12-14

## 2023-12-14 NOTE — TELEPHONE ENCOUNTER
"Requested Prescriptions   Pending Prescriptions Disp Refills    desogestrel-ethinyl estradiol (ISIBLOOM) 0.15-30 MG-MCG tablet 112 tablet 0     Sig: Take 1 tablet by mouth daily       Contraceptives Protocol Passed - 12/14/2023 10:54 AM        Passed - Patient is not a current smoker if age is 35 or older        Passed - Recent (12 mo) or future (30 days) visit within the authorizing provider's specialty     Patient has had an office visit with the authorizing provider or a provider within the authorizing providers department within the previous 12 mos or has a future within next 30 days. See \"Patient Info\" tab in inbasket, or \"Choose Columns\" in Meds & Orders section of the refill encounter.              Passed - Medication is active on med list        Passed - No active pregnancy on record        Passed - No positive pregnancy test in past 12 months           Pt has a 3 month supply sent on 12/13/23   Further refills can be discussed at upcoming appt  Rachel Jacobsen RN on 12/14/2023 at 11:13 AM    "

## 2024-01-02 ENCOUNTER — PATIENT OUTREACH (OUTPATIENT)
Dept: CARE COORDINATION | Facility: CLINIC | Age: 49
End: 2024-01-02
Payer: COMMERCIAL

## 2024-01-04 ENCOUNTER — PATIENT OUTREACH (OUTPATIENT)
Dept: CARE COORDINATION | Facility: CLINIC | Age: 49
End: 2024-01-04
Payer: COMMERCIAL

## 2024-01-18 ENCOUNTER — PATIENT OUTREACH (OUTPATIENT)
Dept: CARE COORDINATION | Facility: CLINIC | Age: 49
End: 2024-01-18
Payer: COMMERCIAL

## 2024-03-06 DIAGNOSIS — N80.9 ENDOMETRIOSIS: ICD-10-CM

## 2024-03-07 RX ORDER — DESOGESTREL AND ETHINYL ESTRADIOL 0.15-0.03
KIT ORAL
Qty: 112 TABLET | Refills: 0 | OUTPATIENT
Start: 2024-03-07

## 2024-03-07 NOTE — TELEPHONE ENCOUNTER
Requested Prescriptions   Pending Prescriptions Disp Refills    ISIBLOOM 0.15-30 MG-MCG tablet [Pharmacy Med Name: ISIBLOOM 0.15MG-0.03MG TABLETS 28S] 112 tablet 0     Sig: TAKE 1 ACTIVE TABLET BY MOUTH DAILY CONTINUOUSLY       Contraceptives Protocol Failed - 3/6/2024  3:13 AM        Failed - Recent (12 mo) or future (30 days) visit within the authorizing provider's specialty     The patient must have completed an in-person or virtual visit within the past 12 months or has a future visit scheduled within the next 90 days with the authorizing provider s specialty.  Urgent care and e-visits do not quality as an office visit for this protocol.          Passed - Patient is not a current smoker if age is 35 or older        Passed - Medication is active on med list        Passed - No active pregnancy on record        Passed - No positive pregnancy test in past 12 months           Last Written Prescription Date:  12/14/23  Last Fill Quantity: 112,  # refills: 0   Last office visit: 1/30/2023 ; last virtual visit: Visit date not found with prescribing provider:  Brett   Future Office Visit:  NONE    Pt due for annual, no appt scheduled. Pt already received one month extension. Rx denied.   Gricelda Addison RN on 3/7/2024 at 5:32 AM

## 2024-04-13 ENCOUNTER — HEALTH MAINTENANCE LETTER (OUTPATIENT)
Age: 49
End: 2024-04-13

## 2025-04-19 ENCOUNTER — HEALTH MAINTENANCE LETTER (OUTPATIENT)
Age: 50
End: 2025-04-19

## (undated) DEVICE — SOL NACL 0.9% INJ 1000ML BAG 2B1324X

## (undated) DEVICE — LINEN TOWEL PACK X5 5464

## (undated) DEVICE — SOL WATER IRRIG 1000ML BOTTLE 2F7114

## (undated) DEVICE — GLOVE PROTEXIS BLUE W/NEU-THERA 7.0  2D73EB70

## (undated) DEVICE — SURGICEL POWDER ABSORBABLE HEMOSTAT 3GM 3013SP

## (undated) DEVICE — EVAC SYSTEM CLEAR FLOW SC082500

## (undated) DEVICE — GLOVE PROTEXIS W/NEU-THERA 6.5  2D73TE65

## (undated) DEVICE — SU MONOCRYL 4-0 PS-2 18" UND Y496G

## (undated) DEVICE — ENDO TROCAR FIRST ENTRY KII FIOS ADV FIX 05X100MM CFF03

## (undated) DEVICE — GLOVE PROTEXIS W/NEU-THERA 7.0  2D73TE70

## (undated) DEVICE — DRSG STERI STRIP 1/4X3" R1541

## (undated) DEVICE — SUCTION IRR STRYKERFLOW II W/TIP 250-070-520

## (undated) DEVICE — PREP DURAPREP 26ML APL 8630

## (undated) DEVICE — ESU CORD MONOPOLAR 10'  E0510

## (undated) DEVICE — CATH TRAY FOLEY SURESTEP 16FR WDRAIN BAG STLK LATEX A300316A

## (undated) DEVICE — Device

## (undated) DEVICE — ENDO APPLICATOR SURGIFLO PLASMA COBLATION MS1995

## (undated) DEVICE — DRSG TELFA 3X8" 1238

## (undated) DEVICE — SUCTION CANISTER MEDIVAC LINER 3000ML W/LID 65651-530

## (undated) DEVICE — ESU HOLDER LAP INST DISP PURPLE LONG 330MM H-PRO-330

## (undated) DEVICE — ENDO TROCAR SLEEVE KII ADV FIXATION 05X100MM CFS02

## (undated) DEVICE — WIPES FOLEY CARE SURESTEP PROVON DFC100

## (undated) RX ORDER — LIDOCAINE HYDROCHLORIDE 20 MG/ML
INJECTION, SOLUTION EPIDURAL; INFILTRATION; INTRACAUDAL; PERINEURAL
Status: DISPENSED
Start: 2020-01-21

## (undated) RX ORDER — PROPOFOL 10 MG/ML
INJECTION, EMULSION INTRAVENOUS
Status: DISPENSED
Start: 2020-01-21

## (undated) RX ORDER — ONDANSETRON 2 MG/ML
INJECTION INTRAMUSCULAR; INTRAVENOUS
Status: DISPENSED
Start: 2023-03-16

## (undated) RX ORDER — HYDROMORPHONE HYDROCHLORIDE 1 MG/ML
INJECTION, SOLUTION INTRAMUSCULAR; INTRAVENOUS; SUBCUTANEOUS
Status: DISPENSED
Start: 2020-01-21

## (undated) RX ORDER — GLYCOPYRROLATE 0.2 MG/ML
INJECTION, SOLUTION INTRAMUSCULAR; INTRAVENOUS
Status: DISPENSED
Start: 2020-01-21

## (undated) RX ORDER — NEOSTIGMINE METHYLSULFATE 1 MG/ML
VIAL (ML) INJECTION
Status: DISPENSED
Start: 2020-01-21

## (undated) RX ORDER — OXYCODONE AND ACETAMINOPHEN 5; 325 MG/1; MG/1
TABLET ORAL
Status: DISPENSED
Start: 2020-01-21

## (undated) RX ORDER — ONDANSETRON 2 MG/ML
INJECTION INTRAMUSCULAR; INTRAVENOUS
Status: DISPENSED
Start: 2020-01-21

## (undated) RX ORDER — FENTANYL CITRATE 0.05 MG/ML
INJECTION, SOLUTION INTRAMUSCULAR; INTRAVENOUS
Status: DISPENSED
Start: 2020-01-21

## (undated) RX ORDER — KETOROLAC TROMETHAMINE 30 MG/ML
INJECTION, SOLUTION INTRAMUSCULAR; INTRAVENOUS
Status: DISPENSED
Start: 2020-01-21

## (undated) RX ORDER — FENTANYL CITRATE 50 UG/ML
INJECTION, SOLUTION INTRAMUSCULAR; INTRAVENOUS
Status: DISPENSED
Start: 2020-01-21

## (undated) RX ORDER — ACETAMINOPHEN 325 MG/1
TABLET ORAL
Status: DISPENSED
Start: 2020-01-21

## (undated) RX ORDER — APREPITANT 40 MG/1
CAPSULE ORAL
Status: DISPENSED
Start: 2020-01-21

## (undated) RX ORDER — FENTANYL CITRATE 50 UG/ML
INJECTION, SOLUTION INTRAMUSCULAR; INTRAVENOUS
Status: DISPENSED
Start: 2023-03-16

## (undated) RX ORDER — DEXAMETHASONE SODIUM PHOSPHATE 4 MG/ML
INJECTION, SOLUTION INTRA-ARTICULAR; INTRALESIONAL; INTRAMUSCULAR; INTRAVENOUS; SOFT TISSUE
Status: DISPENSED
Start: 2020-01-21